# Patient Record
Sex: FEMALE | Race: BLACK OR AFRICAN AMERICAN | Employment: UNEMPLOYED | URBAN - METROPOLITAN AREA
[De-identification: names, ages, dates, MRNs, and addresses within clinical notes are randomized per-mention and may not be internally consistent; named-entity substitution may affect disease eponyms.]

---

## 2017-07-01 RX ORDER — NADOLOL 20 MG/1
TABLET ORAL
Qty: 180 TAB | Refills: 0 | OUTPATIENT
Start: 2017-07-01

## 2017-08-30 ENCOUNTER — OFFICE VISIT (OUTPATIENT)
Dept: FAMILY MEDICINE CLINIC | Age: 62
End: 2017-08-30

## 2017-08-30 VITALS
HEIGHT: 60 IN | DIASTOLIC BLOOD PRESSURE: 88 MMHG | BODY MASS INDEX: 43.15 KG/M2 | TEMPERATURE: 98.2 F | SYSTOLIC BLOOD PRESSURE: 154 MMHG | HEART RATE: 82 BPM | RESPIRATION RATE: 18 BRPM | WEIGHT: 219.8 LBS

## 2017-08-30 DIAGNOSIS — Z23 NEED FOR ZOSTER VACCINATION: ICD-10-CM

## 2017-08-30 DIAGNOSIS — E11.9 TYPE 2 DIABETES MELLITUS WITHOUT COMPLICATION, WITHOUT LONG-TERM CURRENT USE OF INSULIN (HCC): Primary | ICD-10-CM

## 2017-08-30 DIAGNOSIS — Z23 ENCOUNTER FOR IMMUNIZATION: ICD-10-CM

## 2017-08-30 DIAGNOSIS — Z23 NEED FOR TDAP VACCINATION: ICD-10-CM

## 2017-08-30 DIAGNOSIS — Z12.11 COLON CANCER SCREENING: ICD-10-CM

## 2017-08-30 DIAGNOSIS — Z78.0 MENOPAUSE: ICD-10-CM

## 2017-08-30 DIAGNOSIS — Z11.59 NEED FOR HEPATITIS C SCREENING TEST: ICD-10-CM

## 2017-08-30 DIAGNOSIS — I10 HTN (HYPERTENSION), BENIGN: ICD-10-CM

## 2017-08-30 RX ORDER — LOSARTAN POTASSIUM 50 MG/1
50 TABLET ORAL DAILY
Qty: 30 TAB | Refills: 1 | Status: SHIPPED | OUTPATIENT
Start: 2017-08-30 | End: 2018-03-16

## 2017-08-30 NOTE — MR AVS SNAPSHOT
Visit Information Date & Time Provider Department Dept. Phone Encounter #  
 8/30/2017 10:30 AM Roddy Blanchard 35 [id number] Upcoming Health Maintenance Date Due Hepatitis C Screening 1955 MICROALBUMIN Q1 6/6/1966 EYE EXAM RETINAL OR DILATED Q1 6/6/1966 Pneumococcal 19-64 Medium Risk (1 of 1 - PPSV23) 6/6/1975 DTaP/Tdap/Td series (1 - Tdap) 6/6/1977 FOBT Q 1 YEAR AGE 50-75 11/12/2013 PAP AKA CERVICAL CYTOLOGY 11/12/2015 FOOT EXAM Q1 11/21/2015 HEMOGLOBIN A1C Q6M 2/10/2016 ZOSTER VACCINE AGE 60> 4/6/2016 LIPID PANEL Q1 8/10/2016 BREAST CANCER SCRN MAMMOGRAM 2/9/2017 INFLUENZA AGE 9 TO ADULT 8/1/2017 Allergies as of 8/30/2017  Review Complete On: 8/30/2017 By: Deidra Negron MD  
  
 Severity Noted Reaction Type Reactions Percocet [Oxycodone-acetaminophen] Medium 06/03/2011   Not Verified Nausea Only Dick Olp [Propoxyphene N-acetaminophen]  06/03/2011    Other (comments) delusional  
 Hydrochlorothiazide  05/02/2013   Side Effect Other (comments) Leg cramps Lisinopril  05/02/2013   Side Effect Cough Current Immunizations  Reviewed on 12/12/2011 No immunizations on file. Not reviewed this visit You Were Diagnosed With   
  
 Codes Comments Type 2 diabetes mellitus without complication, without long-term current use of insulin (HCC)    -  Primary ICD-10-CM: E11.9 ICD-9-CM: 250.00 HTN (hypertension), benign     ICD-10-CM: I10 
ICD-9-CM: 401.1 Menopause     ICD-10-CM: Z78.0 ICD-9-CM: 627.2 Need for hepatitis C screening test     ICD-10-CM: Z11.59 
ICD-9-CM: V73.89 Colon cancer screening     ICD-10-CM: Z12.11 ICD-9-CM: V76.51 Encounter for immunization     ICD-10-CM: H41 ICD-9-CM: V03.89 Need for zoster vaccination     ICD-10-CM: G14 ICD-9-CM: V04.89  Encounter for screening mammogram for malignant neoplasm of breast     ICD-10-CM: Z12.31 
 ICD-9-CM: A75.57 Need for Tdap vaccination     ICD-10-CM: Y00 ICD-9-CM: V06.1 Vitals BP Pulse Temp Resp Height(growth percentile) Weight(growth percentile) 154/88 82 98.2 °F (36.8 °C) (Oral) 18 5' (1.524 m) 219 lb 12.8 oz (99.7 kg) LMP BMI OB Status Smoking Status 2011 42.93 kg/m2 Postmenopausal Never Smoker Vitals History BMI and BSA Data Body Mass Index Body Surface Area 42.93 kg/m 2 2.05 m 2 Preferred Pharmacy Pharmacy Name Phone [facility] [address] [place] [address] [phone] Your Updated Medication List  
  
   
This list is accurate as of: 17 11:42 AM.  Always use your most recent med list.  
  
  
  
  
 CO Q-10 100 mg capsule Generic drug:  co-enzyme Q-10 Take 100 mg by mouth daily. Diphth, Pertus(Acell), Tetanus 2.5-8-5 Lf-mcg-Lf/0.5mL Susp susp Commonly known as:  BOOSTRIX TDAP  
0.5 mL by IntraMUSCular route once for 1 dose. losartan 50 mg tablet Commonly known as:  COZAAR Take 1 Tab by mouth daily. Milk Thistle 175 mg Cap Take  by mouth.  
  
 multivitamin tablet Commonly known as:  ONE A DAY Take 4 Tabs by mouth daily. nadolol 20 mg tablet Commonly known as:  CORGARD TAKE 1 TABLET BY MOUTH TWICE A DAY  
  
 pneumococcal 23-valent 25 mcg/0.5 mL injection Commonly known as:  PNEUMOVAX 23  
0.5 mL by IntraMUSCular route once for 1 dose. varicella zoster vacine live 19,400 unit/0.65 mL Susr injection Commonly known as:  ZOSTAVAX  
1 Vial by SubCUTAneous route once for 1 dose. Prescriptions Sent to Pharmacy Refills  
 pneumococcal 23-valent (PNEUMOVAX 23) 25 mcg/0.5 mL injection 0 Si.5 mL by IntraMUSCular route once for 1 dose. Class: Normal  
 Pharmacy: [address] [facility], [address] [place] [address] Ph #: [phone] Route: IntraMUSCular Teresa Washington,, Tetanus (BOOSTRIX TDAP) 2.5-8-5 Lf-mcg-Lf/0.5mL susp susp 0 Si.5 mL by IntraMUSCular route once for 1 dose. Class: Normal  
 Pharmacy: [address] Ph #: [phone] Route: IntraMUSCular  
 varicella zoster vacine live (ZOSTAVAX) 19,400 unit/0.65 mL susr injection 0 Si Vial by SubCUTAneous route once for 1 dose. Class: Normal  
 Pharmacy: [address] Ph #: [phone] Route: SubCUTAneous  
 losartan (COZAAR) 50 mg tablet 1 Sig: Take 1 Tab by mouth daily. Class: Normal  
 Pharmacy: [address] Ph #: [phone] Route: Oral  
  
We Performed the Following HEMOGLOBIN A1C WITH EAG [67047 CPT(R)] HEPATIC FUNCTION PANEL [29101 CPT(R)] HEPATITIS C AB [77052 CPT(R)]  DIABETES FOOT EXAM [HM7 Custom] LIPID PANEL [20550 CPT(R)] METABOLIC PANEL, BASIC [02378 CPT(R)] MICROALBUMIN, UR, RAND W/ MICROALBUMIN/CREA RATIO [id number] CPT(R)] OCCULT BLOOD, IMMUNOASSAY (FIT) [id number] CPT(R)] REFERRAL TO OPHTHALMOLOGY [REF57 Custom] Comments:  
 Diabetic eye exam  
  
To-Do List   
 2017 Imaging:  [facility] MAMMO BI SCREENING INCL CAD Referral Information Referral ID Referred By Referred To  
  
 [id number] Layman Gambler [facility] [address] [place], [address] Visits Status Start Date End Date 1 New Request 17 If your referral has a status of pending review or denied, additional information will be sent to support the outcome of this decision. Introducing [facility] HEALTH [facility]! Dear Vega Rossi: Thank you for requesting a OjoOido-[employer] account. Our records indicate that you already have an active OjoOido-[employer]ics account. You can access your account anytime at [email or web address] Did you know that you can access your hospital and ER discharge instructions at any time in twtMob? You can also review all of your test results from your hospital stay or ER visit. Additional Information If you have questions, please visit the Frequently Asked Questions section of the twtMob website at [email or web address] Remember, twtMob is NOT to be used for urgent needs. For medical emergencies, dial 911. Now available from your iPhone and Android! Please provide this summary of care documentation to your next provider. Your primary care clinician is listed as Angelia Torrez. If you have any questions after today's visit, please call [phone].

## 2017-08-30 NOTE — TELEPHONE ENCOUNTER
Pt called requesting refill on Nadolol, stating Dr. Samara Bird forgot to fill this after her appointment today. Pt also notes she's not going to take the vaccines that were sent to the pharmacy for shingles, Tdap and pneumonia because she doesn't have insurance and she never takes the flu shot.  Lyly

## 2017-08-30 NOTE — PROGRESS NOTES
Chief Complaint   Patient presents with    Blood Pressure Check     1. Have you been to the ER, urgent care clinic since your last visit? Hospitalized since your last visit? No    2. Have you seen or consulted any other health care providers outside of the [address] [place] [address] since your last visit? Include any pap smears or colon screening.  No

## 2017-08-30 NOTE — PROGRESS NOTES
HISTORY OF PRESENT ILLNESS  Patrick Blood is a 65 y.o. female. Diabetes   The history is provided by the patient. This is a chronic problem. The current episode started more than 1 week ago. The problem occurs constantly. The problem has been gradually worsening. Pertinent negatives include no chest pain, no abdominal pain, no headaches and no shortness of breath. Nothing aggravates the symptoms. Nothing relieves the symptoms. She has tried nothing for the symptoms. Review of Systems   Constitutional: Negative for weight loss. Weight gain   Eyes: Negative for blurred vision. Respiratory: Negative for shortness of breath. Cardiovascular: Negative for chest pain and leg swelling. Gastrointestinal: Negative for abdominal pain. Genitourinary:        No polyuria   Neurological: Negative for dizziness, sensory change, speech change, focal weakness and headaches. Endo/Heme/Allergies: Negative for polydipsia. Visit Vitals    /88    Pulse 82    Temp 98.2 °F (36.8 °C) (Oral)    Resp 18    Ht 5' (1.524 m)    Wt 219 lb 12.8 oz (99.7 kg)    LMP 06/30/2011    BMI 42.93 kg/m2     Physical Exam   Constitutional: She is oriented to person, place, and time. She appears well-developed and well-nourished. No distress. Cardiovascular: Normal rate, regular rhythm, normal heart sounds and intact distal pulses. Exam reveals no gallop and no friction rub. No murmur heard. Pulmonary/Chest: Effort normal and breath sounds normal. No respiratory distress. She has no wheezes. She has no rales. Neurological: She is alert and oriented to person, place, and time. Normal monofilament exam   Skin: Skin is warm and dry. She is not diaphoretic. Feet and nails in good condition, some onychomycosis   Nursing note and vitals reviewed. ASSESSMENT and PLAN    ICD-10-CM ICD-9-CM    1.  Type 2 diabetes mellitus without complication, without long-term current use of insulin ([facility]) E11.9 250.00 REFERRAL TO OPHTHALMOLOGY       DIABETES FOOT EXAM      LIPID PANEL      METABOLIC PANEL, BASIC      HEPATIC FUNCTION PANEL      HEMOGLOBIN A1C WITH EAG   2. HTN (hypertension), benign I10 401.1 MICROALBUMIN, UR, RAND W/ MICROALBUMIN/CREA RATIO      METABOLIC PANEL, BASIC      losartan (COZAAR) 50 mg tablet   3. Menopause Z78.0 627.2 MAURICE MAMMO BI SCREENING INCL CAD   4. Need for hepatitis C screening test Z11.59 V73.89 HEPATITIS C AB   5. Colon cancer screening Z12.11 V76.51 OCCULT BLOOD, IMMUNOASSAY (FIT)   6. Encounter for immunization Z23 V03.89 pneumococcal 23-valent (PNEUMOVAX 23) 25 mcg/0.5 mL injection   7. Need for zoster vaccination Z23 V04.89 varicella zoster vacine live (ZOSTAVAX) 19,400 unit/0.65 mL susr injection   8. Encounter for screening mammogram for malignant neoplasm of breast Z12.31 V76.12    9. Need for Tdap vaccination Z23 V06.1 Diphth, Pertus,Acell,, Tetanus (BOOSTRIX TDAP) 2.5-8-5 Lf-mcg-Lf/0.5mL susp susp        Unknown diabetic control, on no medications  Blood pressure elevated  Add losartan  Labs per orders. Mammogram  Pneumovax, Zostavax and TDAP at pharmacy  Eye exam  Foot exam  was performed. .  Sensory and motor testing was assessed . Pedal pulse(s) was assessed. Follow-up Disposition:  Return in about 6 weeks (around 10/11/2017) for blood pressure, review labs. Reviewed plan of care. Patient has provided input and agrees with goals.

## 2017-09-03 RX ORDER — NADOLOL 20 MG/1
TABLET ORAL
Qty: 180 TAB | Refills: 0 | Status: SHIPPED | OUTPATIENT
Start: 2017-09-03 | End: 2017-09-22 | Stop reason: SDUPTHER

## 2017-09-23 RX ORDER — NADOLOL 20 MG/1
TABLET ORAL
Qty: 30 TAB | Refills: 1 | Status: SHIPPED | OUTPATIENT
Start: 2017-09-23 | End: 2018-01-17 | Stop reason: SDUPTHER

## 2018-01-17 NOTE — LETTER
1/22/2018 11:27 AM 
 
Ms. Nida Ellington [address] [facility] [phone] Dear Ms. Vamsi: 
 
We've missed you! Please call our office at [phone] and schedule a blood pressure follow up appointment for your continued care. I can not continue to refill your medication until you are seen for an appointment. Look forward to seeing you with in the next 30 days, look forward to seeing you soon.   
 
 
 
Sincerely, 
 
 
Dorothea Silveira MD

## 2018-01-20 RX ORDER — NADOLOL 20 MG/1
TABLET ORAL
Qty: 30 TAB | Refills: 1 | Status: SHIPPED | OUTPATIENT
Start: 2018-01-20 | End: 2018-02-28 | Stop reason: SDUPTHER

## 2018-01-20 NOTE — TELEPHONE ENCOUNTER
Please call patient and schedule them for a blood pressure and diabetic follow up. Let her know I cannot continue to refill her medications until she comes in.

## 2018-03-04 RX ORDER — NADOLOL 20 MG/1
TABLET ORAL
Qty: 30 TAB | Refills: 1 | Status: SHIPPED | OUTPATIENT
Start: 2018-03-04 | End: 2018-03-16 | Stop reason: SDUPTHER

## 2018-03-16 ENCOUNTER — OFFICE VISIT (OUTPATIENT)
Dept: FAMILY MEDICINE CLINIC | Age: 63
End: 2018-03-16

## 2018-03-16 VITALS
WEIGHT: 208 LBS | SYSTOLIC BLOOD PRESSURE: 158 MMHG | RESPIRATION RATE: 18 BRPM | DIASTOLIC BLOOD PRESSURE: 99 MMHG | HEART RATE: 77 BPM | BODY MASS INDEX: 40.84 KG/M2 | HEIGHT: 60 IN | TEMPERATURE: 98.6 F

## 2018-03-16 DIAGNOSIS — E11.9 TYPE 2 DIABETES MELLITUS WITHOUT COMPLICATION, WITHOUT LONG-TERM CURRENT USE OF INSULIN (HCC): Primary | ICD-10-CM

## 2018-03-16 DIAGNOSIS — I10 HTN (HYPERTENSION), BENIGN: ICD-10-CM

## 2018-03-16 RX ORDER — NADOLOL 20 MG/1
TABLET ORAL
Qty: 60 TAB | Refills: 2 | Status: SHIPPED | OUTPATIENT
Start: 2018-03-16 | End: 2018-07-11 | Stop reason: SDUPTHER

## 2018-03-16 NOTE — MR AVS SNAPSHOT
[address] [phone] Patient: Julianne Sosa MRN: [id number] :1955 Visit Information Date & Time Provider Department Dept. Phone Encounter #  
 3/16/2018 10:45 AM Sreedhar Simmons MD [facility] 34 [id number] Follow-up Instructions Return in about 2 months (around 2018) for blood pressure, diabetes. Upcoming Health Maintenance Date Due Hepatitis C Screening 1955 MICROALBUMIN Q1 1966 EYE EXAM RETINAL OR DILATED Q1 1966 Pneumococcal 19-64 Medium Risk (1 of 1 - PPSV23) 1975 DTaP/Tdap/Td series (1 - Tdap) 1977 FOBT Q 1 YEAR AGE 50-75 2013 PAP AKA CERVICAL CYTOLOGY 2015 HEMOGLOBIN A1C Q6M 2/10/2016 ZOSTER VACCINE AGE 60> 2016 LIPID PANEL Q1 8/10/2016 BREAST CANCER SCRN MAMMOGRAM 2017 Influenza Age 5 to Adult 2017 FOOT EXAM Q1 2018 Allergies as of 3/16/2018  Review Complete On: 3/16/2018 By: Sreedhar Simmons MD  
  
 Severity Noted Reaction Type Reactions Percocet [Oxycodone-acetaminophen] Medium 2011   Not Verified Nausea Only Rebecca Huang [Propoxyphene N-acetaminophen]  2011    Other (comments) delusional  
 Hydrochlorothiazide  2013   Side Effect Other (comments) Leg cramps Lisinopril  2013   Side Effect Cough Current Immunizations  Reviewed on 2011 No immunizations on file. Not reviewed this visit You Were Diagnosed With   
  
 Codes Comments Type 2 diabetes mellitus without complication, without long-term current use of insulin (HCC)    -  Primary ICD-10-CM: E11.9 ICD-9-CM: 250.00 HTN (hypertension), benign     ICD-10-CM: I10 
ICD-9-CM: 401.1 Vitals BP Pulse Temp Resp Height(growth percentile) Weight(growth percentile) (!) 158/99 77 98.6 °F (37 °C) (Oral) 18 5' (1.524 m) 208 lb (94.3 kg) LMP BMI OB Status Smoking Status 06/30/2011 40.62 kg/m2 Postmenopausal Never Smoker Vitals History BMI and BSA Data Body Mass Index Body Surface Area  
 40.62 kg/m 2 2 m 2 Preferred Pharmacy Pharmacy Name Phone [address] [place] [address] [phone] Your Updated Medication List  
  
   
This list is accurate as of 3/16/18 11:45 AM.  Always use your most recent med list.  
  
  
  
  
 CO Q-10 100 mg capsule Generic drug:  co-enzyme Q-10 Take 100 mg by mouth daily. Milk Thistle 175 mg Cap Take  by mouth.  
  
 multivitamin tablet Commonly known as:  ONE A DAY Take 4 Tabs by mouth daily. nadolol 20 mg tablet Commonly known as:  CORGARD TAKE ONE TABLET BY MOUTH TWICE DAILY Prescriptions Sent to Pharmacy Refills  
 nadolol (CORGARD) 20 mg tablet 2 Sig: TAKE ONE TABLET BY MOUTH TWICE DAILY Class: Normal  
 Pharmacy: [facility] [address] ETHEL ROWE [address] Ph #: [phone] We Performed the Following HEMOGLOBIN A1C WITH EAG [55600 CPT(R)] HEPATIC FUNCTION PANEL [44515 CPT(R)] METABOLIC PANEL, BASIC [82519 CPT(R)] MICROALBUMIN, UR, RAND W/ MICROALB/CREAT RATIO [id number] CPT(R)] NMR LIPOPROFILE [id number] CPT(R)] Follow-up Instructions Return in about 2 months (around 5/16/2018) for blood pressure, diabetes. Introducing [facility]! Dear Holden Counter: Thank you for requesting a FortaTrust account. Our records indicate that you already have an active FortaTrust account. You can access your account anytime at [email or web address]. Traak Systems/Green Energy Transportation Did you know that you can access your hospital and ER discharge instructions at any time in FortaTrust? You can also review all of your test results from your hospital stay or ER visit. Additional Information If you have questions, please visit the Frequently Asked Questions section of the Base CRMhart website at [email or web address] Remember, Augmenix is NOT to be used for urgent needs. For medical emergencies, dial 911. Now available from your iPhone and Android! Please provide this summary of care documentation to your next provider. Your primary care clinician is listed as Merlene Roger. If you have any questions after today's visit, please call [phone].

## 2018-03-16 NOTE — PROGRESS NOTES
HISTORY OF PRESENT ILLNESS  Nida Ellington is a 65 y.o. female. HPI Comments: Nida Ellington is here for blood pressure follow up and is overdue for diabetic follow. She does not check her blood sugars. Evidently, she checks her blood pressure at home and they have been normal.  At her last visit, I prescribed losartan, however she did not take because she had a reaction to it (near syncope, palpitations). She has not been in because she did not have insurance, however, she hopes to get some in 2 weeks. Blood Pressure Check   The history is provided by the patient. This is a chronic problem. The current episode started more than 1 week ago. The problem occurs constantly. The problem has not changed since onset. Pertinent negatives include no chest pain, no abdominal pain, no headaches and no shortness of breath. Nothing aggravates the symptoms. The symptoms are relieved by medications. Treatments tried: Corgard. The treatment provided moderate relief. Review of Systems   Constitutional: Positive for weight loss. No weight gain   Eyes: Negative for blurred vision. Respiratory: Negative for shortness of breath. Cardiovascular: Negative for chest pain and leg swelling. Gastrointestinal: Negative for abdominal pain. Neurological: Negative for dizziness, sensory change, speech change, focal weakness and headaches. Visit Vitals    BP (!) 158/99    Pulse 77    Temp 98.6 °F (37 °C) (Oral)    Resp 18    Ht 5' (1.524 m)    Wt 208 lb (94.3 kg)    LMP 06/30/2011    BMI 40.62 kg/m2     BP Readings from Last 3 Encounters:   03/16/18 (!) 158/99   08/30/17 154/88   04/01/16 (!) 167/92     Physical Exam   Constitutional: She is oriented to person, place, and time. She appears well-developed and well-nourished. No distress. Cardiovascular: Normal rate, regular rhythm and normal heart sounds. Exam reveals no gallop and no friction rub. No murmur heard.   Pulmonary/Chest: Effort normal and breath sounds normal. No respiratory distress. She has no wheezes. She has no rales. Musculoskeletal: She exhibits no edema. Neurological: She is alert and oriented to person, place, and time. Skin: Skin is warm and dry. She is not diaphoretic. Nursing note and vitals reviewed. ASSESSMENT and PLAN    ICD-10-CM ICD-9-CM    1. Type 2 diabetes mellitus without complication, without long-term current use of insulin (HCC) E11.9 250.00 MICROALBUMIN, UR, RAND W/ MICROALB/CREAT RATIO      HEMOGLOBIN A1C WITH EAG      NMR LIPOPROFILE      HEPATIC FUNCTION PANEL      METABOLIC PANEL, BASIC   2. HTN (hypertension), benign Y26 549.2 METABOLIC PANEL, BASIC      nadolol (CORGARD) 20 mg tablet        Unknown diabetic control  Blood pressure elevated, did not tolerate losartan  Labs per orders. Restart nadolol    Follow-up Disposition:  Return in about 2 months (around 5/16/2018) for blood pressure, diabetes. Reviewed plan of care. Patient has provided input and agrees with goals.

## 2018-03-16 NOTE — PROGRESS NOTES
Chief Complaint   Patient presents with    Blood Pressure Check     f/u     1. Have you been to the ER, urgent care clinic since your last visit? No  Hospitalized since your last visit? No     2. Have you seen or consulted any other health care providers outside of the [address] [place] [address] since your last visit? Include any pap smears or colon screening. No      Pt cannot afford hm due tests due to financial difficulties.

## 2018-07-11 ENCOUNTER — OFFICE VISIT (OUTPATIENT)
Dept: FAMILY MEDICINE CLINIC | Age: 63
End: 2018-07-11

## 2018-07-11 VITALS
SYSTOLIC BLOOD PRESSURE: 136 MMHG | HEART RATE: 82 BPM | BODY MASS INDEX: 41.62 KG/M2 | RESPIRATION RATE: 16 BRPM | OXYGEN SATURATION: 99 % | DIASTOLIC BLOOD PRESSURE: 84 MMHG | HEIGHT: 60 IN | TEMPERATURE: 98.4 F | WEIGHT: 212 LBS

## 2018-07-11 DIAGNOSIS — E11.9 TYPE 2 DIABETES MELLITUS WITHOUT COMPLICATION, WITHOUT LONG-TERM CURRENT USE OF INSULIN (HCC): ICD-10-CM

## 2018-07-11 DIAGNOSIS — Z12.39 SCREENING FOR MALIGNANT NEOPLASM OF BREAST: ICD-10-CM

## 2018-07-11 DIAGNOSIS — I10 HTN (HYPERTENSION), BENIGN: ICD-10-CM

## 2018-07-11 DIAGNOSIS — E11.8 TYPE 2 DIABETES MELLITUS WITH COMPLICATION, WITHOUT LONG-TERM CURRENT USE OF INSULIN (HCC): Primary | ICD-10-CM

## 2018-07-11 DIAGNOSIS — E66.01 MORBID OBESITY (HCC): ICD-10-CM

## 2018-07-11 DIAGNOSIS — E66.01 OBESITY, MORBID (HCC): ICD-10-CM

## 2018-07-11 RX ORDER — NADOLOL 20 MG/1
TABLET ORAL
Qty: 180 TAB | Refills: 3 | Status: SHIPPED | OUTPATIENT
Start: 2018-07-11 | End: 2018-10-22 | Stop reason: SDUPTHER

## 2018-07-11 NOTE — ASSESSMENT & PLAN NOTE
Encouraged patient on diet, exercise and healthy lifestyles  Key Obesity Meds     Patient is on no anti-obesity meds.         No results found for: LEPTN, INSUL, HBA1C, GLU, CHOL, CHOLPOCT, HDL, LDLC, LDL, LDLCEXT, LDLCPOC, TRIGL, TGLPOCT, TSH, NA, NAPOC, K, KPOCT, GPT, ALTPOC, ALT, SGOT, ASTPOC, VITD3, CRP, [id number], TSHEXT

## 2018-07-11 NOTE — PROGRESS NOTES
Chief Complaint   Patient presents with    Hypertension     1. Have you been to the ER, urgent care clinic since your last visit? Hospitalized since your last visit? No    2. Have you seen or consulted any other health care providers outside of the [address] [place] [address] since your last visit? Include any pap smears or colon screening.  No

## 2018-07-11 NOTE — MR AVS SNAPSHOT
[address] 
[phone] Patient: Evita Peñaloza MRN: [id number] :1955 Visit Information Date & Time Provider Department Dept. Phone Encounter #  
 2018  2:00 PM Roddy Galicia 34 [id number] Follow-up Instructions Return in about 4 weeks (around 2018). Upcoming Health Maintenance Date Due Hepatitis C Screening 1955 MICROALBUMIN Q1 1966 EYE EXAM RETINAL OR DILATED Q1 1966 Pneumococcal 19-64 Medium Risk (1 of 1 - PPSV23) 1975 DTaP/Tdap/Td series (1 - Tdap) 1977 FOBT Q 1 YEAR AGE 50-75 2013 PAP AKA CERVICAL CYTOLOGY 2015 HEMOGLOBIN A1C Q6M 2/10/2016 ZOSTER VACCINE AGE 60> 2016 LIPID PANEL Q1 8/10/2016 BREAST CANCER SCRN MAMMOGRAM 2017 Influenza Age 5 to Adult 2018 FOOT EXAM Q1 2018 Allergies as of 2018  Review Complete On: 2018 By: Amadou Coker MD  
  
 Severity Noted Reaction Type Reactions Percocet [Oxycodone-acetaminophen] Medium 2011   Not Verified Nausea Only Climmie Em [Propoxyphene N-acetaminophen]  2011    Other (comments) delusional  
 Hydrochlorothiazide  2013   Side Effect Other (comments) Leg cramps Lisinopril  2013   Side Effect Cough Current Immunizations  Reviewed on 2011 No immunizations on file. Not reviewed this visit You Were Diagnosed With   
  
 Codes Comments Type 2 diabetes mellitus with complication, without long-term current use of insulin (HCC)    -  Primary ICD-10-CM: E11.8 ICD-9-CM: 250.90 HTN (hypertension), benign     ICD-10-CM: I10 
ICD-9-CM: 401.1 Morbid obesity ([facility]     ICD-10-CM: E66.01 
ICD-9-CM: 278.01 Screening for malignant neoplasm of breast     ICD-10-CM: Z12.31 
ICD-9-CM: V76.10 Type 2 diabetes mellitus without complication, without long-term current use of insulin (HCC)     ICD-10-CM: E11.9 ICD-9-CM: 250.00 Obesity, morbid ([address]     ICD-10-CM: E66.01 
ICD-9-CM: 278.01 Vitals BP Pulse Temp Resp Height(growth percentile) Weight(growth percentile) (!) 159/93 (BP 1 Location: Left arm, BP Patient Position: Sitting) 82 98.4 °F (36.9 °C) (Oral) 16 5' (1.524 m) 212 lb (96.2 kg) LMP SpO2 BMI OB Status Smoking Status 06/30/2011 99% 41.4 kg/m2 Postmenopausal Never Smoker Vitals History BMI and BSA Data Body Mass Index Body Surface Area  
 41.4 kg/m 2 2.02 m 2 Preferred Pharmacy Pharmacy Name Phone [address] [phone] Your Updated Medication List  
  
   
This list is accurate as of 7/11/18  2:44 PM.  Always use your most recent med list.  
  
  
  
  
 CO Q-10 100 mg capsule Generic drug:  co-enzyme Q-10 Take 300 mg by mouth daily. FISH -160-1,000 mg Cap Generic drug:  omega 3-dha-epa-fish oil Take  by mouth. Milk Thistle 175 mg Cap Take  by mouth.  
  
 multivitamin tablet Commonly known as:  ONE A DAY Take 4 Tabs by mouth daily. nadolol 20 mg tablet Commonly known as:  CORGARD TAKE ONE TABLET BY MOUTH TWICE DAILY Prescriptions Sent to Pharmacy Refills  
 nadolol (CORGARD) 20 mg tablet 3 Sig: TAKE ONE TABLET BY MOUTH TWICE DAILY Class: Normal  
 Pharmacy: [address] Ph #: [phone] We Performed the Following CBC W/O DIFF [11091 CPT(R)] HEMOGLOBIN A1C WITH EAG [32545 CPT(R)] LIPID PANEL [53361 CPT(R)] METABOLIC PANEL, COMPREHENSIVE [89794 CPT(R)] MICROALBUMIN, UR, RAND W/ MICROALB/CREAT RATIO [id number] CPT(R)] TSH+FREE T4 [id number] CPT(R)] Follow-up Instructions Return in about 4 weeks (around 8/8/2018). To-Do List   
 Around 07/11/2018 Imaging:  [facility] 3D AMMY W MAMMO BI SCREENING INCL CAD Patient Instructions HTN (hypertension), benign The patient has bp that is well controlled at home and is tolerating meds without side effect, will refill medicaiton Type 2 diabetes mellitus without complication (HCC) Will recheck labs and evaluate, address possible treatment if indicated Obesity Encouraged patient on diet, exercise and healthy lifestyles Obesity, morbid ([address] Encouraged patient on diet, exercise and healthy lifestyles Key Obesity Meds Patient is on no anti-obesity meds. No results found for: LEPTN, INSUL, HBA1C, GLU, CHOL, CHOLPOCT, HDL, LDLC, LDL, LDLCEXT, LDLCPOC, TRIGL, TGLPOCT, TSH, NA, NAPOC, K, KPOCT, GPT, ALTPOC, ALT, SGOT, ASTPOC, VITD3, CRP, [id number], TSHEXT Introducing [facility]! Dear Branden Medina: Thank you for requesting a Dianping account. Our records indicate that you already have an active Dianping account. You can access your account anytime at [email or web address] Did you know that you can access your hospital and ER discharge instructions at any time in Dianping? You can also review all of your test results from your hospital stay or ER visit. Additional Information If you have questions, please visit the Frequently Asked Questions section of the Dianping website at [email or web address] Remember, Dianping is NOT to be used for urgent needs. For medical emergencies, dial 911. Now available from your iPhone and Android! Please provide this summary of care documentation to your next provider. Your primary care clinician is listed as Basil Phoenix. If you have any questions after today's visit, please call [phone].

## 2018-07-11 NOTE — PROGRESS NOTES
Family Medicine Follow-Up Progress Note  Patient: Yennifer Dinero  1955, 59 y.o., female  Encounter Date: 7/11/2018    ASSESSMENT & PLAN  HTN (hypertension), benign  The patient has bp that is well controlled at home and is tolerating meds without side effect, will refill medicaiton    Type 2 diabetes mellitus without complication ([facility] [address]  Will recheck labs and evaluate, address possible treatment if indicated    Obesity  Encouraged patient on diet, exercise and healthy lifestyles    Obesity, morbid ([address]  Encouraged patient on diet, exercise and healthy lifestyles  Key Obesity Meds     Patient is on no anti-obesity meds. No results found for: LEPTN, INSUL, HBA1C, GLU, CHOL, CHOLPOCT, HDL, LDLC, LDL, LDLCEXT, LDLCPOC, TRIGL, TGLPOCT, TSH, NA, NAPOC, K, KPOCT, GPT, ALTPOC, ALT, SGOT, ASTPOC, VITD3, CRP, [id number], TSHEXT          Orders Placed This Encounter    [facility] 3D AMMY W MAMMO BI SCREENING INCL CAD     Standing Status:   Future     Standing Expiration Date:   8/11/2019     Order Specific Question:   Reason for Exam     Answer:   screening    CBC W/O DIFF    HEMOGLOBIN A1C WITH EAG    LIPID PANEL    METABOLIC PANEL, COMPREHENSIVE    MICROALBUMIN, UR, RAND W/ MICROALB/CREAT RATIO    TSH+FREE T4    omega 3-dha-epa-fish oil (FISH OIL) 100-160-1,000 mg cap     Sig: Take  by mouth.  nadolol (CORGARD) 20 mg tablet     Sig: TAKE ONE TABLET BY MOUTH TWICE DAILY     Dispense:  180 Tab     Refill:  3         ICD-10-CM ICD-9-CM    1. Type 2 diabetes mellitus with complication, without long-term current use of insulin (HCC) E11.8 250.90 CBC W/O DIFF      HEMOGLOBIN A1C WITH EAG      LIPID PANEL      METABOLIC PANEL, COMPREHENSIVE      MICROALBUMIN, UR, RAND W/ MICROALB/CREAT RATIO      TSH+FREE T4   2. HTN (hypertension), benign I10 401.1 nadolol (CORGARD) 20 mg tablet      LIPID PANEL      TSH+FREE T4   3. Morbid obesity (HCC) E66.01 278.01 TSH+FREE T4   4.  Screening for malignant neoplasm of breast Z12.31 V76.10 [facility] 3D AMMY W MAMMO BI SCREENING INCL CAD   5. Type 2 diabetes mellitus without complication, without long-term current use of insulin (HCC) E11.9 250.00    6. Obesity, morbid ([facility] E66.01 278.01        CHIEF COMPLAINT  Chief Complaint   Patient presents with    Hypertension       CARLYN Duke is a 59 y.o. female presenting today for follow up of HTN. She does not believe she has DM2, She reports home readings for BP is 120-140/70-90 and she reports compliance with her medications without side effects. No headache or vision changes. Not checking blood sugars at home, she was told in ER once that her BG was normal and she is not on medications. She tries to follow a healthy diet and then she tries to keep her weight stable. No recent weight loss, she reports she is very active in her day to day life. Review of Systems   Constitutional: Negative for chills and fever. Eyes: Negative for visual disturbance. Respiratory: Negative for shortness of breath. Cardiovascular: Negative for chest pain and leg swelling. Gastrointestinal: Negative for constipation, diarrhea, nausea and vomiting. Genitourinary: Negative for difficulty urinating. Musculoskeletal: Negative for arthralgias and myalgias. Neurological: Negative for seizures, syncope and headaches. Psychiatric/Behavioral:        At Baseline, stable   All other systems reviewed and are negative. OBJECTIVE  Visit Vitals    /84 (BP 1 Location: Left arm, BP Patient Position: Sitting)    Pulse 82    Temp 98.4 °F (36.9 °C) (Oral)    Resp 16    Ht 5' (1.524 m)    Wt 212 lb (96.2 kg)    LMP 06/30/2011    SpO2 99%    BMI 41.4 kg/m2     136/84 on manual recheck by physician    Physical Exam   Constitutional: She is oriented to person, place, and time. She appears well-developed and well-nourished. No distress. NAD, Nontoxic, Appears Stated Age, morbidly obese   HENT:   Head: Normocephalic and atraumatic. Mouth/Throat: Oropharynx is clear and moist.   Eyes: Conjunctivae and EOM are normal. Right eye exhibits no discharge. Left eye exhibits no discharge. No scleral icterus. Neck: Neck supple. Cardiovascular: Normal rate, regular rhythm and normal heart sounds. No murmur heard. Pulmonary/Chest: Effort normal and breath sounds normal. No stridor. No respiratory distress. She has no wheezes. She has no rales. Abdominal: Soft. Bowel sounds are normal. She exhibits no distension. There is no tenderness. Musculoskeletal: She exhibits no edema or tenderness. Neurological: She is alert and oriented to person, place, and time. Grossly intact CN   Skin: Skin is warm and dry. No rash noted. She is not diaphoretic. Psychiatric: She has a normal mood and affect. Her behavior is normal.   Nursing note and vitals reviewed. No results found for any visits on 07/11/18. HISTORICAL  Reviewed and updated today, and as noted below:    Past Medical History:   Diagnosis Date    Diabetes ([facility] [address] 7/3/2014    Financial difficulties 11/23/2014    HTN (hypertension), benign 5/21/2012    Hypertension     Obesity 11/12/2012     Past Surgical History:   Procedure Laterality Date    CARDIAC SURG PROCEDURE UNLIST      cardiac cath    HX GYN      c section    HX OTHER SURGICAL      breast reduction     History reviewed. No pertinent family history.   History   Smoking Status    Never Smoker   Smokeless Tobacco    Never Used     Social History     Social History    Marital status: SINGLE     Spouse name: N/A    Number of children: N/A    Years of education: N/A     Social History Main Topics    Smoking status: Never Smoker    Smokeless tobacco: Never Used    Alcohol use No    Drug use: No    Sexual activity: Yes     Birth control/ protection: None     Other Topics Concern    None     Social History Narrative     Allergies   Allergen Reactions    Percocet [Oxycodone-Acetaminophen] Nausea Only    Darvocet A500 [Propoxyphene N-Acetaminophen] Other (comments)     delusional    Hydrochlorothiazide Other (comments)     Leg cramps    Lisinopril Cough       No visits with results within 3 Month(s) from this visit. Latest known visit with results is:    Office Visit on 08/10/2015   Component Date Value Ref Range Status    Hemoglobin A1c 08/10/2015 6.6* 4.8 - 5.6 % Final    Comment:          Increased risk for diabetes: 5.7 - 6.4           Diabetes: >6.4           Glycemic control for adults with diabetes: <7.0      Cholesterol, total 08/10/2015 144  100 - 199 mg/dL Final    Triglyceride 08/10/2015 66  0 - 149 mg/dL Final    HDL Cholesterol 08/10/2015 63  >39 mg/dL Final    Comment: According to ATP-III Guidelines, HDL-C >59 mg/dL is considered a  negative risk factor for CHD.  VLDL, calculated 08/10/2015 13  5 - 40 mg/dL Final    LDL, calculated 08/10/2015 68  0 - 99 mg/dL Final    Glucose 08/10/2015 96  65 - 99 mg/dL Final    BUN 08/10/2015 9  6 - 24 mg/dL Final    Creatinine 08/10/2015 0.79  0.57 - 1.00 mg/dL Final    GFR est non-AA 08/10/2015 82  >59 mL/min/1.73 Final    GFR est AA 08/10/2015 95  >59 mL/min/1.73 Final    BUN/Creatinine ratio 08/10/2015 11  9 - 23 Final    Sodium 08/10/2015 140  134 - 144 mmol/L Final    Potassium 08/10/2015 4.5  3.5 - 5.2 mmol/L Final    Chloride 08/10/2015 100  97 - 108 mmol/L Final    CO2 08/10/2015 26  18 - 29 mmol/L Final    Calcium 08/10/2015 9.6  8.7 - 10.2 mg/dL Final    Protein, total 08/10/2015 6.6  6.0 - 8.5 g/dL Final    Albumin 08/10/2015 4.5  3.5 - 5.5 g/dL Final    GLOBULIN, TOTAL 08/10/2015 2.1  1.5 - 4.5 g/dL Final    A-G Ratio 08/10/2015 2.1  1.1 - 2.5 Final    Bilirubin, total 08/10/2015 0.2  0.0 - 1.2 mg/dL Final    Alk.  phosphatase 08/10/2015 55  39 - 117 IU/L Final    AST (SGOT) 08/10/2015 17  0 - 40 IU/L Final    ALT (SGPT) 08/10/2015 16  0 - 32 IU/L Final    WBC 08/10/2015 4.6  3.4 - 10.8 x10E3/uL Final    RBC 08/10/2015 4.63  3.77 - 5.28 x10E6/uL Final    HGB 08/10/2015 11.7  11.1 - 15.9 g/dL Final    HCT 08/10/2015 37.0  34.0 - 46.6 % Final    MCV 08/10/2015 80  79 - 97 fL Final    MCH 08/10/2015 25.3* 26.6 - 33.0 pg Final    MCHC 08/10/2015 31.6  31.5 - 35.7 g/dL Final    RDW 08/10/2015 17.3* 12.3 - 15.4 % Final    PLATELET 63/73/7129 661  150 - 379 x10E3/uL Final    VITAMIN D, 25-HYDROXY 08/10/2015 26.4* 30.0 - 100.0 ng/mL Final    Comment: Vitamin D deficiency has been defined by the [facility] practice guideline as a  level of serum 25-OH vitamin D less than 20 ng/mL (1,2). The Endocrine Society went on to further define vitamin D  insufficiency as a level between 21 and 29 ng/mL (2). 1. IOM ([place] of Medicine). 2010. Dietary reference     intakes for calcium and D. [address] [facility]: The     Re5ult. 2. Betsey MF, Radha NC, Terry JAMES, et al.     Evaluation, treatment, and prevention of vitamin D     deficiency: an Endocrine Society clinical practice     guideline. JCEM. 2011 Jul; 96(7):1911-30.  INTERPRETATION 08/10/2015 Note   Final    Supplement report is available. Kathie Arrieta MD  [facility]  07/11/18 2:03 PM    Portions of this note may have been populated using smart dictation software and may have \"sounds-like\" errors present. Pt was counseled on risks, benefits and alternatives of treatment options. All questions were asked and answered and the patient was agreeable with the treatment plan as outlined.

## 2018-07-11 NOTE — PATIENT INSTRUCTIONS
HTN (hypertension), benign  The patient has bp that is well controlled at home and is tolerating meds without side effect, will refill medicaiton    Type 2 diabetes mellitus without complication ([facility]  Will recheck labs and evaluate, address possible treatment if indicated    Obesity  Encouraged patient on diet, exercise and healthy lifestyles    Obesity, morbid ([facility]  Encouraged patient on diet, exercise and healthy lifestyles  Key Obesity Meds     Patient is on no anti-obesity meds.         No results found for: LEPTN, INSUL, HBA1C, GLU, CHOL, CHOLPOCT, HDL, LDLC, LDL, LDLCEXT, LDLCPOC, TRIGL, TGLPOCT, TSH, NA, NAPOC, K, KPOCT, GPT, ALTPOC, ALT, SGOT, ASTPOC, VITD3, CRP, [id number], TSHEXT

## 2018-07-11 NOTE — ASSESSMENT & PLAN NOTE
The patient has bp that is well controlled at home and is tolerating meds without side effect, will refill medicaiton

## 2018-08-24 ENCOUNTER — HOSPITAL ENCOUNTER (EMERGENCY)
Age: 63
Discharge: HOME | End: 2018-08-24
Attending: EMERGENCY MEDICINE | Admitting: EMERGENCY MEDICINE
Payer: SELF-PAY

## 2018-08-24 VITALS
HEIGHT: 64 IN | BODY MASS INDEX: 37.22 KG/M2 | DIASTOLIC BLOOD PRESSURE: 83 MMHG | SYSTOLIC BLOOD PRESSURE: 152 MMHG | RESPIRATION RATE: 19 BRPM | HEART RATE: 88 BPM | TEMPERATURE: 98.2 F | WEIGHT: 218 LBS | OXYGEN SATURATION: 96 %

## 2018-08-24 DIAGNOSIS — I47.1 SVT (SUPRAVENTRICULAR TACHYCARDIA) (HCC): Primary | ICD-10-CM

## 2018-08-24 LAB
ANION GAP SERPL CALC-SCNC: 12 MMOL/L (ref 5–15)
BUN SERPL-MCNC: 13 MG/DL (ref 6–20)
BUN/CREAT SERPL: 11 (ref 12–20)
CALCIUM SERPL-MCNC: 8.6 MG/DL (ref 8.5–10.1)
CHLORIDE SERPL-SCNC: 101 MMOL/L (ref 97–108)
CO2 SERPL-SCNC: 24 MMOL/L (ref 21–32)
COMMENT, HOLDF: NORMAL
CREAT SERPL-MCNC: 1.18 MG/DL (ref 0.55–1.02)
GLUCOSE SERPL-MCNC: 266 MG/DL (ref 65–100)
MAGNESIUM SERPL-MCNC: 2.1 MG/DL (ref 1.6–2.4)
POTASSIUM SERPL-SCNC: 4 MMOL/L (ref 3.5–5.1)
SAMPLES BEING HELD,HOLD: NORMAL
SODIUM SERPL-SCNC: 137 MMOL/L (ref 136–145)
TROPONIN I SERPL-MCNC: 0.2 NG/ML
TROPONIN I SERPL-MCNC: <0.05 NG/ML
TSH SERPL DL<=0.05 MIU/L-ACNC: 4.02 UIU/ML (ref 0.36–3.74)

## 2018-08-24 PROCEDURE — 99285 EMERGENCY DEPT VISIT HI MDM: CPT

## 2018-08-24 PROCEDURE — 74011250637 HC RX REV CODE- 250/637: Performed by: FAMILY MEDICINE

## 2018-08-24 PROCEDURE — 84484 ASSAY OF TROPONIN QUANT: CPT | Performed by: FAMILY MEDICINE

## 2018-08-24 PROCEDURE — 84443 ASSAY THYROID STIM HORMONE: CPT | Performed by: EMERGENCY MEDICINE

## 2018-08-24 PROCEDURE — 93005 ELECTROCARDIOGRAM TRACING: CPT

## 2018-08-24 PROCEDURE — 80048 BASIC METABOLIC PNL TOTAL CA: CPT | Performed by: EMERGENCY MEDICINE

## 2018-08-24 PROCEDURE — 36415 COLL VENOUS BLD VENIPUNCTURE: CPT | Performed by: EMERGENCY MEDICINE

## 2018-08-24 PROCEDURE — 83735 ASSAY OF MAGNESIUM: CPT | Performed by: EMERGENCY MEDICINE

## 2018-08-24 RX ORDER — DILTIAZEM HYDROCHLORIDE 30 MG/1
30 TABLET, FILM COATED ORAL
Status: COMPLETED | OUTPATIENT
Start: 2018-08-24 | End: 2018-08-24

## 2018-08-24 RX ORDER — DILTIAZEM HYDROCHLORIDE 30 MG/1
30 TABLET, FILM COATED ORAL 3 TIMES DAILY
Qty: 90 TAB | Refills: 0 | Status: SHIPPED | OUTPATIENT
Start: 2018-08-24 | End: 2019-08-16

## 2018-08-24 RX ADMIN — DILTIAZEM HYDROCHLORIDE 30 MG: 30 TABLET, FILM COATED ORAL at 19:36

## 2018-08-24 NOTE — ED PROVIDER NOTES
HPI Comments: 59year old female with PMH diabetes, HTN, obesity brought in by EMS with SVT. Patient states she was cleaning at about 4:55 P. M when she started to feel \"swirly,\" sweaty, and felt her heart racing. She called her daughter and then laid down. She felt better after she laid down. Her daughter came and tried to get her in the car but the pt felt too badly so they called EMS. Per EMS, upon their arrival, the pt's HR was in the 220s and she was in SVT. They gave her 6mg of adenosine. She remained in SVT. They gave her 12mg of adenosine. She converted to NSR. She vomited once. The patient denies headache, dizziness, vision changes, chest pain, shortness of breath, abdominal pain, nausea, edema. Patient is a 59 y.o. female presenting with palpitations. Irregular Heart Beat    Associated symptoms include vomiting. Pertinent negatives include no fever, no chest pain, no abdominal pain, no nausea, no headaches, no dizziness, no cough and no shortness of breath. EKGs from EMS:              Past Medical History:   Diagnosis Date    Diabetes ([facility] 7/3/2014    Financial difficulties 11/23/2014    HTN (hypertension), benign 5/21/2012    Hypertension     Obesity 11/12/2012       Past Surgical History:   Procedure Laterality Date    CARDIAC SURG PROCEDURE UNLIST      cardiac cath    HX GYN      c section    HX OTHER SURGICAL      breast reduction         No family history on file. Social History     Social History    Marital status: SINGLE     Spouse name: N/A    Number of children: N/A    Years of education: N/A     Occupational History    Not on file.      Social History Main Topics    Smoking status: Never Smoker    Smokeless tobacco: Never Used    Alcohol use No    Drug use: No    Sexual activity: Yes     Birth control/ protection: None     Other Topics Concern    Not on file     Social History Narrative         ALLERGIES: Percocet [oxycodone-acetaminophen]; Darvocet a500 [propoxyphene n-acetaminophen]; Hydrochlorothiazide; and Lisinopril    Review of Systems   Constitutional: Negative for chills and fever. Eyes: Negative for visual disturbance. Respiratory: Negative for cough and shortness of breath. Cardiovascular: Positive for palpitations. Negative for chest pain and leg swelling. Gastrointestinal: Positive for vomiting. Negative for abdominal pain, constipation, diarrhea and nausea. Neurological: Negative for dizziness and headaches. All other systems reviewed and are negative. Vitals:    08/24/18 1809   BP: (!) 174/103   Pulse: (!) 110   Resp: 24   Temp: 98.2 °F (36.8 °C)   SpO2: 96%   Weight: 98.9 kg (218 lb)   Height: 5' 4\" (1.626 m)            Physical Exam   Constitutional: She is oriented to person, place, and time. She appears well-developed and well-nourished. No distress. HENT:   Head: Normocephalic and atraumatic. Eyes: Conjunctivae are normal.   Neck: No JVD present. Cardiovascular: Regular rhythm. Tachycardia present. Exam reveals no gallop and no friction rub. No murmur heard. Pulmonary/Chest: Effort normal and breath sounds normal. No respiratory distress. She has no wheezes. Abdominal: Soft. Bowel sounds are normal. There is no tenderness. Musculoskeletal: She exhibits no edema. Neurological: She is alert and oriented to person, place, and time. Skin: Skin is warm. She is diaphoretic. Psychiatric: She has a normal mood and affect. EKG: sinus tachycardia, rate 107, normal axis, prolonged QT, PVCs, LVH, nonspecific ST and T wave abnormalities     Recent Results (from the past 12 hour(s))   SAMPLES BEING HELD    Collection Time: 08/24/18  6:15 PM   Result Value Ref Range    SAMPLES BEING HELD LV. ARTURO     COMMENT        Add-on orders for these samples will be processed based on acceptable specimen integrity and analyte stability, which may vary by analyte.    METABOLIC PANEL, BASIC    Collection Time: 08/24/18  6:15 PM Result Value Ref Range    Sodium 137 136 - 145 mmol/L    Potassium 4.0 3.5 - 5.1 mmol/L    Chloride 101 97 - 108 mmol/L    CO2 24 21 - 32 mmol/L    Anion gap 12 5 - 15 mmol/L    Glucose 266 (H) 65 - 100 mg/dL    BUN 13 6 - 20 MG/DL    Creatinine 1.18 (H) 0.55 - 1.02 MG/DL    BUN/Creatinine ratio 11 (L) 12 - 20      GFR est AA 56 (L) >60 ml/min/1.73m2    GFR est non-AA 46 (L) >60 ml/min/1.73m2    Calcium 8.6 8.5 - 10.1 MG/DL   TSH 3RD GENERATION    Collection Time: 08/24/18  6:15 PM   Result Value Ref Range    TSH 4.02 (H) 0.36 - 3.74 uIU/mL   MAGNESIUM    Collection Time: 08/24/18  6:15 PM   Result Value Ref Range    Magnesium 2.1 1.6 - 2.4 mg/dL   TROPONIN I    Collection Time: 08/24/18  6:15 PM   Result Value Ref Range    Troponin-I, Qt. <0.05 <0.05 ng/mL         7:29 PM  Spoke with Dr. Chester Gomez of cardiology. He recommended starting cardizem 30mg PO tid and following up with him next week. I reviewed this plan with the patient. All questions were answered. Discussed with attending Dr. Manas Horton who agrees. 8:00 PM  Patient signed out to Dr. Manas Horton.      [facility]      ED Course       Procedures

## 2018-08-24 NOTE — DISCHARGE INSTRUCTIONS
Supraventricular Tachycardia: Care Instructions  Your Care Instructions    Having supraventricular tachycardia (SVT) means that from time to time your heart beats abnormally fast. This fast rhythm is caused by changes in the electrical system of your heart. You may feel a fluttering in your chest (palpitations) and have a fast pulse. When your heart is beating fast, you may feel anxious and lightheaded, be short of breath, and feel discomfort in the chest.  Your doctor may prescribe medicines to help slow down your heartbeat. Your doctor may also suggest you try vagal maneuvers when having an episode of SVT. These are things, like bearing down, that might help slow your heart rate. Bearing down means that you try to breathe out with your stomach muscles but you don't let air out of your nose or mouth. Your doctor can show you how to do vagal maneuvers. He or she may suggest you lie down on your back to do them. In some cases, either cardioversion treatment or a procedure called catheter ablation is done to correct SVT. Your doctor may ask you to wear a small electronic device for 1 or 2 days to monitor your heart. It is called a Holter monitor. Follow-up care is a key part of your treatment and safety. Be sure to make and go to all appointments, and call your doctor if you are having problems. It's also a good idea to know your test results and keep a list of the medicines you take. How can you care for yourself at home? · Be safe with medicines. Take your medicines exactly as prescribed. Call your doctor if you think you are having a problem with your medicine. You will get more details on the specific medicines your doctor prescribes. · If your doctor showed you how to do vagal maneuvers, try them when you have an episode. These maneuvers include bearing down or putting an ice-cold, wet towel on your face. · Monitor your condition by keeping a diary of your SVT episodes.  Bring this to your doctor appointments. ¨ Write down how fast or slow your heart was beating. To count your heart rate:  § Gently place 2 fingers of your hand on the inside of your other wrist, below your thumb. § Count the beats for 30 seconds. § Then, double the result to get the number of beats per minute. ¨ Write down if your heart rhythm was regular or irregular. ¨ Write down the symptoms you had. ¨ Write down the time of day your symptoms occurred. ¨ Write down how long your symptoms lasted. ¨ Write down what you were doing when your symptoms started. ¨ Write down what may have helped your symptoms go away. · If they trigger episodes, limit or avoid alcohol or drinks with caffeine. · Do not use over-the-counter decongestants, herbal remedies, diet pills, or \"pep\" pills, which often contain stimulants. · Do not use illegal drugs, such as cocaine, ecstasy, or methamphetamine, which can speed up your heart's rhythm. · Do not smoke. Smoking can make this condition worse. If you need help quitting, talk to your doctor about stop-smoking programs and medicines. These can increase your chances of quitting for good. · Be alert for new or worsening symptoms, such as shortness of breath, pounding of your heart, or unusual tiredness. If new symptoms develop or your symptoms become worse, call your doctor. When should you call for help? Call 911 anytime you think you may need emergency care. For example, call if:    · You passed out (lost consciousness).     · You are short of breath.    Call your doctor now or seek immediate medical care if:    · You have a fast heartbeat.     · You are dizzy or lightheaded, or feel like you may faint.    Watch closely for changes in your health, and be sure to contact your doctor if:    · You do not get better as expected. Where can you learn more? Go to http://ashok-ian.info/.   Enter G244 in the search box to learn more about \"Supraventricular Tachycardia: Care Instructions. \"  Current as of: December 6, 2017  Content Version: 11.7  © 2975-9751 [employer], [facility]. Care instructions adapted under license by "[employer]" (which disclaims liability or warranty for this information). If you have questions about a medical condition or this instruction, always ask your healthcare professional. [place] [address] any warranty or liability for your use of this information.

## 2018-08-24 NOTE — ED TRIAGE NOTES
Pt arrived via EMS due to SVT that began 20 minutes prior to EMS arrival. Per EMS, pt was diaphoretic, nauseated, and vomiting upon arrival. EMS administered adenosine 6 mg/12 mg (2 doses/18 mg total). Pt converted after the second dose. Pt is is sinus tach on EMS monitor. Pt A&Ox4.

## 2018-08-25 NOTE — ED NOTES
PROVIDED WITH DC INSTRUCTIONS BY PROVIDER. VERBALIZED UNDERSTANDING.  AMBULATED OUT OF ED WITH FAMILY WITH STEADY UPRIGHT GAIT

## 2018-08-25 NOTE — ED NOTES
PT RESTING IN BED WITH DAUGHTER AT BEDSIDE. AOX4. SKIN WARM, DRY, PINK. RR EVEN AND UNLABORED. NAD. DENIES ANY COMPLAINTS AT THIS TIME. UPDATED ON POC.  WILL CONT TO MONITOR

## 2018-08-28 LAB
ATRIAL RATE: 107 BPM
CALCULATED P AXIS, ECG09: 46 DEGREES
CALCULATED R AXIS, ECG10: -3 DEGREES
CALCULATED T AXIS, ECG11: -30 DEGREES
DIAGNOSIS, 93000: NORMAL
P-R INTERVAL, ECG05: 128 MS
Q-T INTERVAL, ECG07: 374 MS
QRS DURATION, ECG06: 92 MS
QTC CALCULATION (BEZET), ECG08: 499 MS
VENTRICULAR RATE, ECG03: 107 BPM

## 2018-08-29 ENCOUNTER — OFFICE VISIT (OUTPATIENT)
Dept: CARDIOLOGY CLINIC | Age: 63
End: 2018-08-29

## 2018-08-29 VITALS
RESPIRATION RATE: 18 BRPM | SYSTOLIC BLOOD PRESSURE: 132 MMHG | OXYGEN SATURATION: 98 % | BODY MASS INDEX: 35.44 KG/M2 | WEIGHT: 207.6 LBS | DIASTOLIC BLOOD PRESSURE: 82 MMHG | HEART RATE: 83 BPM | HEIGHT: 64 IN

## 2018-08-29 DIAGNOSIS — E11.9 TYPE 2 DIABETES MELLITUS WITHOUT COMPLICATION, WITHOUT LONG-TERM CURRENT USE OF INSULIN (HCC): ICD-10-CM

## 2018-08-29 DIAGNOSIS — I10 HTN (HYPERTENSION), BENIGN: Primary | ICD-10-CM

## 2018-08-29 NOTE — PROGRESS NOTES
Chief Complaint   Patient presents with    Irregular Heart Beat     seen in [facility] ER 8/24/18     1. Have you been to the ER, urgent care clinic since your last visit? Hospitalized since your last visit? Yes, 8/24/18,[facility] ER,irregular hear beat    2. Have you seen or consulted any other health care providers outside of the [address] [place] [address] since your last visit? Include any pap smears or colon screening.  No    Visit Vitals    /82 (BP 1 Location: Left arm, BP Patient Position: Sitting)    Pulse 83    Resp 18    Ht 5' 4\" (1.626 m)    Wt 207 lb 9.6 oz (94.2 kg)    LMP 06/30/2011    SpO2 98%    BMI 35.63 kg/m2

## 2018-08-29 NOTE — PROGRESS NOTES
CARDIOLOGY OFFICE NOTE                                        Jose Carreon MD, [address], [address], [place], [address] [facility]                                                       Phone [phone]; Fax [phone]                                                      Mobile [phone]   Voice Mail [phone]    No admission date for patient encounter. Suresh Pineda MD    :  1955   MRN:  [id number]     LAST OFFICE VISIT : -           Perry Oneal is a 59 y.o. female I am seeing for ED follow up. On 18 patient experienced a \"swirling and sweaty\" sensation and felt her heart race. She was brought to the ED and was found to have SVT. Her Troponin was slightly elevated, but this was felt to be due to SVT. She converted to normal sinus rhythm after receiving 12 mg of Adenosine. In the past she had an echo that showed EF 50%. She also had anormal stress echo in the past.     Today, she states she has never experienced this in the past. She attributes it to caffeine. She does not smoke cigarettes. She has no family history of cardiac disease. She did not start Cardizem 30 mg TID. She has a history of HTN and DM. She states she consumes a healthy diet, and cooks for herself. She does not exercise regularly. She does not drink alcohol. She states she does not have LOREN. Patient denies any exertional chest pain, dyspnea, syncope, orthopnea, edema or paroxysmal nocturnal dyspnea.       Allergies   Allergen Reactions    Percocet [Oxycodone-Acetaminophen] Nausea Only    Darvocet A500 [Propoxyphene N-Acetaminophen] Other (comments)     delusional    Hydrochlorothiazide Other (comments)     Leg cramps    Lisinopril Cough         Past Medical History:   Diagnosis Date    Financial difficulties 11/23/2014    HTN (hypertension), benign 5/21/2012    Hypertension     Obesity 11/12/2012        Past Surgical History:   Procedure Laterality Date    CARDIAC SURG PROCEDURE UNLIST      cardiac cath    HX GYN      c section    HX OTHER SURGICAL      breast reduction       Home Medications:  Current Outpatient Prescriptions   Medication Sig    dilTIAZem (CARDIZEM) 30 mg tablet Take 1 Tab by mouth three (3) times daily.  omega 3-dha-epa-fish oil (FISH OIL) 100-160-1,000 mg cap Take  by mouth.  nadolol (CORGARD) 20 mg tablet TAKE ONE TABLET BY MOUTH TWICE DAILY    Milk Thistle 175 mg Cap Take  by mouth.  multivitamin (ONE A DAY) tablet Take 4 Tabs by mouth daily.  co-enzyme Q-10 (CO Q-10) 100 mg capsule Take 300 mg by mouth daily. No current facility-administered medications for this visit. OBJECTIVE      Lab Results   Component Value Date/Time    INR 1.0 04/28/2012 12:00 AM    Prothrombin time 10.3 04/28/2012 12:00 AM                   CARDIOMETRICS      1. Stress echo  5/11/2012: baseline sinus tach, no ischemia or LV dysfunction    2. Cardiac Cath  5/15/12- EF 50%, No CAD    3. Lipids  8/10/15- , LDL 68, HDL 63, TG 66                                                            Social History:  Social History   Substance Use Topics    Smoking status: Never Smoker    Smokeless tobacco: Never Used    Alcohol use No       Family History:  No family history on file. Review of Symptoms:  A comprehensive review of systems ,particularly related to cardiovascular system, was negative except for that written in the HPI.     Physical Exam:    Visit Vitals    LMP 06/30/2011       Physical Exam:  Gen: Well-developed, well-nourished, in no acute distress  alert and oriented x 3  HEENT:  Pink conjunctivae, Hearing grossly normal.No scleral icterus or conjunctival, moist mucous membranes  Neck: Supple,No JVD, No Carotid Bruit, Thyroid- non tender No cervical lymphadenopathy  Resp: No accessory muscle use, Clear breath sounds, No rales or rhonchi  Card: Regular Rate,Rythm,Normal S1, S2, No murmurs, rubs or gallop. No thrills. Abd:  Soft, non-tender, non-distended, normoactive bowel sounds are present,   MSK: No cyanosis or clubbing, good capillary refill  Skin: No rashes or ulcers, no bruising  Neuro:  Cranial nerves are grossly intact, moving all four extremities, no focal deficit, follows commands appropriately  Psych:  Good insight, oriented to person, place and time, alert, Nml Affect  LE: No edema  Vascular: Distal Pulses 2+ and symmetric        Medication change on this office visit: I have reviewed patients medication list and have made no changes today. ASSESSMENT/RECOMMENDATION:  1) SVT  - She has not had any further episodes. She is not taking Cardizen regularly. I told her to take Cardizem PRN and tell me if she needs to use it. - I will order an echo to look at [facility] and left atrial size and a lexiscan cardiolite to assess heart function because her Troponin was elevated. - I went over vagal maneuver today and discussed those options with her    2) Healthcare Maintenance  - I advised patient to floss regularly. - I recommended she exercise regularly and continue a healthy diet for weight loss    Follow up in 6 months        Patient Care Team:  Parrish Davila MD as PCP - General (Family Practice)  Flori Cheung RN as Nurse Navigator (Cardiology)  Grazyna Mayes LPN as Ambulatory Care Navigator [facility])    I have extensively reviewed all testing with the patient. I have discussed the diagnosis with the patient and the intended plan as seen in the above orders. Questions were answered concerning future plans. I have discussed medication side effects and warnings with the patient as well. Kiran Martinez is in agreement to the plan listed above and wishes to proceed.      she  was instructed not to smoke, eat heart healthy diet  and to exercise. Thank you for this consult.     Sue Dia    Written by Aashish Durham, as dictated by Dr. Delfino Bass.

## 2018-08-29 NOTE — MR AVS SNAPSHOT
[address] [facility] [address] [facility] 
[phone] Patient: Solange Foster MRN: [id number] :1955 Visit Information Date & Time Provider Department Dept. Phone Encounter #  
 2018  3:40 PM Cara Gore MD [facility] [phone] [id number] Follow-up Instructions Return in about 6 months (around 2019). Your Appointments 2018  3:00 PM  
ECHO CARDIOGRAMS 2D with MANDY RAY  
[facility] ([facility]) Appt Note: echo per dr Missy Gaytan [address] [facility] [address] [facility]  
[phone]  
  
   
 [address] [address]  
  
    
 3/27/2019  3:20 PM  
ESTABLISHED PATIENT with Cara Gore MD  
[facility] ([facility]) Appt Note: 6 mo fup  
 [address] [facility] [address] [facility]  
[address] [facility] [address] [address] Upcoming Health Maintenance Date Due Hepatitis C Screening 1955 MICROALBUMIN Q1 1966 EYE EXAM RETINAL OR DILATED Q1 1966 Pneumococcal 19-64 Medium Risk (1 of 1 - PPSV23) 1975 DTaP/Tdap/Td series (1 - Tdap) 1977 FOBT Q 1 YEAR AGE 50-75 2013 PAP AKA CERVICAL CYTOLOGY 2015 HEMOGLOBIN A1C Q6M 2/10/2016 ZOSTER VACCINE AGE 60> 2016 LIPID PANEL Q1 8/10/2016 BREAST CANCER SCRN MAMMOGRAM 2017 Influenza Age 5 to Adult 2018 FOOT EXAM Q1 2018 Allergies as of 2018  Review Complete On: 2018 By: Craa Gore MD  
  
 Severity Noted Reaction Type Reactions Percocet [Oxycodone-acetaminophen] Medium 2011   Not Verified Nausea Only Michelle Abed [Propoxyphene N-acetaminophen]  2011    Other (comments)  delusional  
 Hydrochlorothiazide  05/02/2013   Side Effect Other (comments) Leg cramps Lisinopril  05/02/2013   Side Effect Cough Current Immunizations  Reviewed on 12/12/2011 No immunizations on file. Not reviewed this visit You Were Diagnosed With   
  
 Codes Comments HTN (hypertension), benign    -  Primary ICD-10-CM: I10 
ICD-9-CM: 401.1 Type 2 diabetes mellitus without complication, without long-term current use of insulin (HCC)     ICD-10-CM: E11.9 ICD-9-CM: 250.00 Vitals BP Pulse Resp Height(growth percentile) Weight(growth percentile) LMP  
 132/82 (BP 1 Location: Left arm, BP Patient Position: Sitting) 83 18 5' 4\" (1.626 m) 207 lb 9.6 oz (94.2 kg) 06/30/2011 SpO2 BMI OB Status Smoking Status 98% 35.63 kg/m2 Postmenopausal Never Smoker Vitals History BMI and BSA Data Body Mass Index Body Surface Area  
 35.63 kg/m 2 2.06 m 2 Preferred Pharmacy Pharmacy Name Phone [address] [phone] Your Updated Medication List  
  
   
This list is accurate as of 8/29/18  4:26 PM.  Always use your most recent med list.  
  
  
  
  
 CO Q-10 100 mg capsule Generic drug:  co-enzyme Q-10 Take 300 mg by mouth daily. dilTIAZem 30 mg tablet Commonly known as:  CARDIZEM Take 1 Tab by mouth three (3) times daily. FISH -160-1,000 mg Cap Generic drug:  omega 3-dha-epa-fish oil Take 1 Tab by mouth daily. Milk Thistle 175 mg Cap Take 175 mg by mouth daily. multivitamin tablet Commonly known as:  ONE A DAY Take 1 Tab by mouth daily. nadolol 20 mg tablet Commonly known as:  CORGARD TAKE ONE TABLET BY MOUTH TWICE DAILY Follow-up Instructions Return in about 6 months (around 2/28/2019). To-Do List   
 08/29/2018 ECHO:  2D ECHO COMPLETE ADULT (TTE) W OR WO CONTR   
  
 08/29/2018 ECG:  STRESS TEST LEXISCAN/CARDIOLITE Introducing [facility]! Dear Raffi Vera: Thank you for requesting a [employer] account. Our records indicate that you already have an active [employer] account. You can access your account anytime at [email or web address] Did you know that you can access your hospital and ER discharge instructions at any time in [employer]? You can also review all of your test results from your hospital stay or ER visit. Additional Information If you have questions, please visit the Frequently Asked Questions section of the [employer] website at [email or web address] Remember, [facility] is NOT to be used for urgent needs. For medical emergencies, dial 911. Now available from your iPhone and Android! Please provide this summary of care documentation to your next provider. Your primary care clinician is listed as Oniel Farah. If you have any questions after today's visit, please call [phone].

## 2018-09-05 ENCOUNTER — CLINICAL SUPPORT (OUTPATIENT)
Dept: CARDIOLOGY CLINIC | Age: 63
End: 2018-09-05

## 2018-09-05 DIAGNOSIS — I47.1 SVT (SUPRAVENTRICULAR TACHYCARDIA) (HCC): Primary | ICD-10-CM

## 2018-09-17 ENCOUNTER — DOCUMENTATION ONLY (OUTPATIENT)
Dept: CARDIOLOGY CLINIC | Age: 63
End: 2018-09-17

## 2018-09-17 NOTE — PROGRESS NOTES
Echocardiogram report      Please call patient and let them know EF is low normal and this is ok. Otherwise a mildly leaky valve of no concern. Overall looks good.     Jai Mitchell MD

## 2018-10-22 DIAGNOSIS — I10 HTN (HYPERTENSION), BENIGN: ICD-10-CM

## 2018-10-22 RX ORDER — NADOLOL 20 MG/1
TABLET ORAL
Qty: 180 TAB | Refills: 0 | Status: SHIPPED | OUTPATIENT
Start: 2018-10-22 | End: 2019-03-01 | Stop reason: SDUPTHER

## 2018-10-22 NOTE — TELEPHONE ENCOUNTER
Pt called requesting refill be sent to [facility] DR ETHEL ROWE for Nadolol.   Pt rescheduled for follow up with Dr. Janell Silverio on 10/29/18 at 2:15 pm. Wilderm

## 2019-03-01 DIAGNOSIS — I10 HTN (HYPERTENSION), BENIGN: ICD-10-CM

## 2019-03-04 RX ORDER — NADOLOL 20 MG/1
TABLET ORAL
Qty: 180 TAB | Refills: 0 | Status: SHIPPED | OUTPATIENT
Start: 2019-03-04 | End: 2019-08-09 | Stop reason: SDUPTHER

## 2019-08-09 DIAGNOSIS — I10 HTN (HYPERTENSION), BENIGN: ICD-10-CM

## 2019-08-12 RX ORDER — NADOLOL 20 MG/1
TABLET ORAL
Qty: 180 TAB | Refills: 0 | Status: SHIPPED | OUTPATIENT
Start: 2019-08-12 | End: 2019-10-11

## 2019-08-16 ENCOUNTER — OFFICE VISIT (OUTPATIENT)
Dept: FAMILY MEDICINE CLINIC | Age: 64
End: 2019-08-16

## 2019-08-16 VITALS
DIASTOLIC BLOOD PRESSURE: 97 MMHG | RESPIRATION RATE: 18 BRPM | HEIGHT: 64 IN | BODY MASS INDEX: 37.36 KG/M2 | TEMPERATURE: 97.6 F | HEART RATE: 72 BPM | OXYGEN SATURATION: 100 % | SYSTOLIC BLOOD PRESSURE: 180 MMHG | WEIGHT: 218.8 LBS

## 2019-08-16 DIAGNOSIS — E66.01 CLASS 2 SEVERE OBESITY DUE TO EXCESS CALORIES WITH SERIOUS COMORBIDITY AND BODY MASS INDEX (BMI) OF 37.0 TO 37.9 IN ADULT (HCC): ICD-10-CM

## 2019-08-16 DIAGNOSIS — R79.89 ELEVATED TSH: ICD-10-CM

## 2019-08-16 DIAGNOSIS — I10 HTN (HYPERTENSION), BENIGN: Primary | ICD-10-CM

## 2019-08-16 DIAGNOSIS — E11.65 UNCONTROLLED TYPE 2 DIABETES MELLITUS WITH HYPERGLYCEMIA (HCC): ICD-10-CM

## 2019-08-16 NOTE — PROGRESS NOTES
Chief Complaint   Patient presents with    Hypertension     Follow up     1. Have you been to the ER, urgent care clinic since your last visit? Hospitalized since your last visit? No    2. Have you seen or consulted any other health care providers outside of the [address] [place] [address] since your last visit? Include any pap smears or colon screening. No    Provider notified patient's B/P 180/97.

## 2019-08-16 NOTE — PATIENT INSTRUCTIONS
Uncontrolled hypertension  Due for labs as ordered  Continue with nadolol, given her diabetic history we may need to consider using an ACE or an ARB  I have encouraged her to follow a healthy diet, and exercise moderate weight loss is recommended to decrease her cardiovascular risk factors and improve her cardiovascular fitness level  She had an abnormal TSH that was elevated in the past, I had an extensive discussion with her about the pathophysiology of thyroid disease and we will recheck her thyroid  It appears that she has uncontrolled type 2 diabetes that is not presently being treated  I discussed with her some treatment options and again the pathophysiology of diabetes and insulin and insulin resistance and we have settled on her going to get labs prior to us making any decisions about treatment  She does have 2 documented A1c's over the level of 6.5 and she had a documented random blood sugar last year of 266 but no labs since then  She presently does not have any insurance coverage but her plan is to work with [facility] [employer] to make sure that she is able to get the resources that she needs  I have recommended to her that she schedule a Pap smear, mammogram, and colonoscopy, she was understanding of this and is hoping to have coverage for preventative services so that she might be able to do this  I have asked her to call with questions or concerns and follow-up in 1 month  Alarm and return precautions were discussed and accepted

## 2019-08-16 NOTE — PROGRESS NOTES
Family Medicine Follow-Up Progress Note  Patient: Abby Lopez  1955, 62 y.o., female  Encounter Date: 8/16/2019    ASSESSMENT & PLAN    ICD-10-CM ICD-9-CM    1. HTN (hypertension), benign C22 552.1 METABOLIC PANEL, COMPREHENSIVE      TSH 3RD GENERATION   2. Uncontrolled type 2 diabetes mellitus with hyperglycemia (HCC) N99.75 901.36 METABOLIC PANEL, COMPREHENSIVE      LIPID PANEL      HEMOGLOBIN A1C WITH EAG      CBC W/O DIFF   3. Class 2 severe obesity due to excess calories with serious comorbidity and body mass index (BMI) of 37.0 to 37.9 in adult (HCC) E66.01 278.01     Z68.37 V85.37    4.  Elevated TSH R79.89 794.5 TSH 3RD GENERATION       Orders Placed This Encounter    METABOLIC PANEL, COMPREHENSIVE    LIPID PANEL    HEMOGLOBIN A1C WITH EAG    CBC W/O DIFF    TSH 3RD GENERATION       Patient Instructions   Uncontrolled hypertension  Due for labs as ordered  Continue with nadolol, given her diabetic history we may need to consider using an ACE or an ARB  I have encouraged her to follow a healthy diet, and exercise moderate weight loss is recommended to decrease her cardiovascular risk factors and improve her cardiovascular fitness level  She had an abnormal TSH that was elevated in the past, I had an extensive discussion with her about the pathophysiology of thyroid disease and we will recheck her thyroid  It appears that she has uncontrolled type 2 diabetes that is not presently being treated  I discussed with her some treatment options and again the pathophysiology of diabetes and insulin and insulin resistance and we have settled on her going to get labs prior to us making any decisions about treatment  She does have 2 documented A1c's over the level of 6.5 and she had a documented random blood sugar last year of 266 but no labs since then  She presently does not have any insurance coverage but her plan is to work with [employer] to make sure that she is able to get the resources that she needs  I have recommended to her that she schedule a Pap smear, mammogram, and colonoscopy, she was understanding of this and is hoping to have coverage for preventative services so that she might be able to do this  I have asked her to call with questions or concerns and follow-up in 1 month  Alarm and return precautions were discussed and accepted      CHIEF COMPLAINT  Chief Complaint   Patient presents with    Hypertension     Follow up       Kimmie Cota is a 62 y.o. female presenting today for htn follow up, she has not been seen in over a year   her blood pressure is elevated today, however she reports that she checks at home  Usually sbp 130-140/70s Checking at home  She reports that she had some abnormal feelings once after having had caffeine once and had a bout of SVT and was given cardizem once but does not take that or anything else and this has not happened again  Today she denies any headache or vision changes, no chest pain or shortness of breath  She does have trouble losing weight  She was in the ER since last time she was seen by me one year ago  He has a history of diabetes and it appears that this is uncontrolled right now, she is not taking any medications and she was not aware of this diagnosis she reports  In the ER she had an abnormal thyroid study, she has been lost to follow-up and we had not recheck this, she reports this is a new finding to her and she was not aware that she had any thyroid dysfunction  She has not lost any weight since her last visit  She had an episode of SVT requiring adenosine, she reports that she was seen by cardiology and that this has not recurred  She was given Cardizem but she did not take it.   She does take the nadolol regularly  She reports that she has not had an opportunity to take care of some of the health maintenance activities because she does not currently have insurance but she does understand the necessity to do this and she is working on getting set up with an insurance program or with the care card  Today she has no nausea or vomiting, no diarrhea or constipation  She has no leg swelling, she has no trouble laying flat to sleep  She denies daytime sleepiness or fatigue    Review of Systems  A 12 point review of systems was negative except as noted here or in the HPI. OBJECTIVE  Visit Vitals  BP (!) 180/97 (BP 1 Location: Left arm, BP Patient Position: Sitting)   Pulse 72   Temp 97.6 °F (36.4 °C) (Oral)   Resp 18   Ht 5' 4\" (1.626 m)   Wt 218 lb 12.8 oz (99.2 kg)   LMP 06/30/2011   SpO2 100%   BMI 37.56 kg/m²       Physical Exam   Constitutional: She is oriented to person, place, and time. She appears well-developed and well-nourished. No distress. NAD, Nontoxic, Appears Stated Age, morbidly obese   HENT:   Head: Normocephalic and atraumatic. Mouth/Throat: Oropharynx is clear and moist.   Eyes: Conjunctivae and EOM are normal. Right eye exhibits no discharge. Left eye exhibits no discharge. No scleral icterus. Neck: Neck supple. No thyromegaly present. Cardiovascular: Normal rate, regular rhythm and normal heart sounds. No murmur heard. Pulmonary/Chest: Effort normal and breath sounds normal. No stridor. No respiratory distress. She has no wheezes. She has no rales. Abdominal: Soft. Bowel sounds are normal. She exhibits no distension. There is no tenderness. Musculoskeletal: She exhibits no edema or tenderness. Neurological: She is alert and oriented to person, place, and time. Grossly intact CN   Skin: Skin is warm and dry. No rash noted. She is not diaphoretic. Acanthosis nigricans noted at the base of the neck   Psychiatric: She has a normal mood and affect. Her behavior is normal.   Nursing note and vitals reviewed. No results found for any visits on 08/16/19.     HISTORICAL  Reviewed and updated today, and as noted below:    Past Medical History:   Diagnosis Date    Financial difficulties 11/23/2014    HTN (hypertension), benign 5/21/2012    Hypertension     Obesity 11/12/2012     Past Surgical History:   Procedure Laterality Date    CARDIAC SURG PROCEDURE UNLIST      cardiac cath    HX GYN      c section    HX OTHER SURGICAL      breast reduction     History reviewed. No pertinent family history. Social History     Tobacco Use   Smoking Status Never Smoker   Smokeless Tobacco Never Used     Social History     Socioeconomic History    Marital status: SINGLE     Spouse name: Not on file    Number of children: Not on file    Years of education: Not on file    Highest education level: Not on file   Tobacco Use    Smoking status: Never Smoker    Smokeless tobacco: Never Used   Substance and Sexual Activity    Alcohol use: No    Drug use: No    Sexual activity: Yes     Birth control/protection: None     Allergies   Allergen Reactions    Percocet [Oxycodone-Acetaminophen] Nausea Only    Darvocet A500 [Propoxyphene N-Acetaminophen] Other (comments)     delusional    Hydrochlorothiazide Other (comments)     Leg cramps    Lisinopril Cough       No visits with results within 3 Month(s) from this visit.    Latest known visit with results is:   Admission on 08/24/2018, Discharged on 08/24/2018   Component Date Value Ref Range Status    Ventricular Rate 08/24/2018 107  BPM Final    Atrial Rate 08/24/2018 107  BPM Final    P-R Interval 08/24/2018 128  ms Final    QRS Duration 08/24/2018 92  ms Final    Q-T Interval 08/24/2018 374  ms Final    QTC Calculation (Bezet) 08/24/2018 499  ms Final    Calculated P Axis 08/24/2018 46  degrees Final    Calculated R Axis 08/24/2018 -3  degrees Final    Calculated T Axis 08/24/2018 -30  degrees Final    Diagnosis 08/24/2018    Final                    Value:Sinus tachycardia with occasional premature ventricular complexes  Voltage criteria for left ventricular hypertrophy  Nonspecific ST and T wave abnormality  Abnormal ECG  When compared with ECG of 24-JUN-2014 18:19,  premature ventricular complexes are now present  Non-specific change in ST segment in Lateral leads  Inverted T waves have replaced nonspecific T wave abnormality in Inferior   leads  QT has lengthened  Confirmed by Parker Howard MD. ([id number]) on 8/28/2018 11:32:10 AM      SAMPLES BEING HELD 08/24/2018 LV. ARTURO   Final    COMMENT 08/24/2018 Add-on orders for these samples will be processed based on acceptable specimen integrity and analyte stability, which may vary by analyte. Final    Sodium 08/24/2018 137  136 - 145 mmol/L Final    Potassium 08/24/2018 4.0  3.5 - 5.1 mmol/L Final    Chloride 08/24/2018 101  97 - 108 mmol/L Final    CO2 08/24/2018 24  21 - 32 mmol/L Final    Anion gap 08/24/2018 12  5 - 15 mmol/L Final    Glucose 08/24/2018 266* 65 - 100 mg/dL Final    BUN 08/24/2018 13  6 - 20 MG/DL Final    Creatinine 08/24/2018 1.18* 0.55 - 1.02 MG/DL Final    BUN/Creatinine ratio 08/24/2018 11* 12 - 20   Final    GFR est AA 08/24/2018 56* >60 ml/min/1.73m2 Final    GFR est non-AA 08/24/2018 46* >60 ml/min/1.73m2 Final    Comment: Estimated GFR is calculated using the IDMS-traceable Modification of Diet in Renal Disease (MDRD) Study equation, reported for both  Americans (GFRAA) and non- Americans (GFRNA), and normalized to 1.73m2 body surface area. The physician must decide which value applies to the patient. The MDRD study equation should only be used in individuals age 25 or older. It has not been validated for the following: pregnant women, patients with serious comorbid conditions, or on certain medications, or persons with extremes of body size, muscle mass, or nutritional status.  Calcium 08/24/2018 8.6  8.5 - 10.1 MG/DL Final    TSH 08/24/2018 4.02* 0.36 - 3.74 uIU/mL Final    Comment: (NOTE)  Due to TSH heterogeneity, both structurally and degree of   glycosylation, monoclonal antibodies used in the TSH assay may not   accurately quantitate TSH.  Therefore, this result should be   correlated with clinical findings as well as with other assessments   of thyroid function, e.g., free T4, free T3.      Magnesium 08/24/2018 2.1  1.6 - 2.4 mg/dL Final    Troponin-I, Qt. 08/24/2018 <0.05  <0.05 ng/mL Final    Comment: The presence of detectable troponin above the reference range indicates myocardial injury which may be due to ischemia, myocarditis, trauma, etc.  Clinical correlation is necessary to establish the significance of this finding. Sequential testing is recommended to determine if the typical rise and fall of cTnI is demonstrated. Note:  Cardiac troponin I has a relatively long half life and may be present well after the CK MB has returned to baseline. The reference range is based on the 99th percentile of the referent population.  Troponin-I, Qt. 08/24/2018 0.20* <0.05 ng/mL Final    Comment: CALLED TO AND READ BACK BY  STEVE PERRY AT 2145/JS           Makenzie Kapoor MD  [facility]  08/16/19 8:31 AM    Portions of this note may have been populated using smart dictation software and may have \"sounds-like\" errors present. Pt was counseled on risks, benefits and alternatives of treatment options. All questions were asked and answered and the patient was agreeable with the treatment plan as outlined.

## 2019-08-18 ENCOUNTER — HOSPITAL ENCOUNTER (EMERGENCY)
Age: 64
Discharge: HOME | End: 2019-08-18
Attending: EMERGENCY MEDICINE
Payer: SELF-PAY

## 2019-08-18 VITALS
BODY MASS INDEX: 42.6 KG/M2 | DIASTOLIC BLOOD PRESSURE: 89 MMHG | RESPIRATION RATE: 18 BRPM | HEIGHT: 60 IN | TEMPERATURE: 98.6 F | HEART RATE: 85 BPM | WEIGHT: 217 LBS | OXYGEN SATURATION: 95 % | SYSTOLIC BLOOD PRESSURE: 175 MMHG

## 2019-08-18 DIAGNOSIS — R03.0 ELEVATED BLOOD PRESSURE READING: Primary | ICD-10-CM

## 2019-08-18 LAB
ALBUMIN SERPL-MCNC: 4 G/DL (ref 3.5–5)
ALBUMIN/GLOB SERPL: 1.1 {RATIO} (ref 1.1–2.2)
ALP SERPL-CCNC: 72 U/L (ref 45–117)
ALT SERPL-CCNC: 27 U/L (ref 12–78)
ANION GAP SERPL CALC-SCNC: 3 MMOL/L (ref 5–15)
APPEARANCE UR: CLEAR
AST SERPL-CCNC: 23 U/L (ref 15–37)
BACTERIA URNS QL MICRO: NEGATIVE /HPF
BASOPHILS # BLD: 0.1 K/UL (ref 0–0.1)
BASOPHILS NFR BLD: 1 % (ref 0–1)
BILIRUB SERPL-MCNC: 0.3 MG/DL (ref 0.2–1)
BILIRUB UR QL: NEGATIVE
BUN SERPL-MCNC: 14 MG/DL (ref 6–20)
BUN/CREAT SERPL: 17 (ref 12–20)
CALCIUM SERPL-MCNC: 8.9 MG/DL (ref 8.5–10.1)
CHLORIDE SERPL-SCNC: 106 MMOL/L (ref 97–108)
CO2 SERPL-SCNC: 30 MMOL/L (ref 21–32)
COLOR UR: ABNORMAL
COMMENT, HOLDF: NORMAL
CREAT SERPL-MCNC: 0.83 MG/DL (ref 0.55–1.02)
DIFFERENTIAL METHOD BLD: ABNORMAL
EOSINOPHIL # BLD: 0.3 K/UL (ref 0–0.4)
EOSINOPHIL NFR BLD: 4 % (ref 0–7)
EPITH CASTS URNS QL MICRO: ABNORMAL /LPF
ERYTHROCYTE [DISTWIDTH] IN BLOOD BY AUTOMATED COUNT: 15.4 % (ref 11.5–14.5)
GLOBULIN SER CALC-MCNC: 3.8 G/DL (ref 2–4)
GLUCOSE SERPL-MCNC: 129 MG/DL (ref 65–100)
GLUCOSE UR STRIP.AUTO-MCNC: NEGATIVE MG/DL
HCT VFR BLD AUTO: 38.5 % (ref 35–47)
HGB BLD-MCNC: 12 G/DL (ref 11.5–16)
HGB UR QL STRIP: NEGATIVE
IMM GRANULOCYTES # BLD AUTO: 0 K/UL (ref 0–0.04)
IMM GRANULOCYTES NFR BLD AUTO: 0 % (ref 0–0.5)
KETONES UR QL STRIP.AUTO: NEGATIVE MG/DL
LEUKOCYTE ESTERASE UR QL STRIP.AUTO: ABNORMAL
LYMPHOCYTES # BLD: 3.1 K/UL (ref 0.8–3.5)
LYMPHOCYTES NFR BLD: 45 % (ref 12–49)
MCH RBC QN AUTO: 25.8 PG (ref 26–34)
MCHC RBC AUTO-ENTMCNC: 31.2 G/DL (ref 30–36.5)
MCV RBC AUTO: 82.6 FL (ref 80–99)
MONOCYTES # BLD: 0.4 K/UL (ref 0–1)
MONOCYTES NFR BLD: 6 % (ref 5–13)
NEUTS SEG # BLD: 3 K/UL (ref 1.8–8)
NEUTS SEG NFR BLD: 44 % (ref 32–75)
NITRITE UR QL STRIP.AUTO: NEGATIVE
NRBC # BLD: 0 K/UL (ref 0–0.01)
NRBC BLD-RTO: 0 PER 100 WBC
PH UR STRIP: 6.5 [PH] (ref 5–8)
PLATELET # BLD AUTO: 273 K/UL (ref 150–400)
PMV BLD AUTO: 10.3 FL (ref 8.9–12.9)
POTASSIUM SERPL-SCNC: 3.8 MMOL/L (ref 3.5–5.1)
PROT SERPL-MCNC: 7.8 G/DL (ref 6.4–8.2)
PROT UR STRIP-MCNC: NEGATIVE MG/DL
RBC # BLD AUTO: 4.66 M/UL (ref 3.8–5.2)
RBC #/AREA URNS HPF: ABNORMAL /HPF (ref 0–5)
SAMPLES BEING HELD,HOLD: NORMAL
SODIUM SERPL-SCNC: 139 MMOL/L (ref 136–145)
SP GR UR REFRACTOMETRY: 1 (ref 1–1.03)
TROPONIN I SERPL-MCNC: <0.05 NG/ML
UA: UC IF INDICATED,UAUC: ABNORMAL
UROBILINOGEN UR QL STRIP.AUTO: 0.2 EU/DL (ref 0.2–1)
WBC # BLD AUTO: 6.8 K/UL (ref 3.6–11)
WBC URNS QL MICRO: ABNORMAL /HPF (ref 0–4)

## 2019-08-18 PROCEDURE — 81001 URINALYSIS AUTO W/SCOPE: CPT

## 2019-08-18 PROCEDURE — 99284 EMERGENCY DEPT VISIT MOD MDM: CPT

## 2019-08-18 PROCEDURE — 85025 COMPLETE CBC W/AUTO DIFF WBC: CPT

## 2019-08-18 PROCEDURE — 84484 ASSAY OF TROPONIN QUANT: CPT

## 2019-08-18 PROCEDURE — 80053 COMPREHEN METABOLIC PANEL: CPT

## 2019-08-18 PROCEDURE — 36415 COLL VENOUS BLD VENIPUNCTURE: CPT

## 2019-08-18 PROCEDURE — 93005 ELECTROCARDIOGRAM TRACING: CPT

## 2019-08-18 NOTE — ED PROVIDER NOTES
HPI       62y F here with high blood pressure. Wasn't feeling well this AM and it was 190/110's. After this, she took her BP meds. Again this afternoon says she is not feeling well but can't really describe sx's more than that. No trouble breathing. No fever. No chest pain. No stomach pain. No weakness or numbness. No  HA. ED EKG interpretation:  Rhythm: normal sinus rhythm; and regular . Rate (approx.): 95; Axis: normal; P wave: normal; QRS interval: normal ; ST/T wave: normal;  This EKG was interpreted by Yue Fritz MD,ED Provider. Past Medical History:   Diagnosis Date    Financial difficulties 11/23/2014    HTN (hypertension), benign 5/21/2012    Hypertension     Obesity 11/12/2012       Past Surgical History:   Procedure Laterality Date    CARDIAC SURG PROCEDURE UNLIST      cardiac cath    HX GYN      c section    HX OTHER SURGICAL      breast reduction         No family history on file.     Social History     Socioeconomic History    Marital status: SINGLE     Spouse name: Not on file    Number of children: Not on file    Years of education: Not on file    Highest education level: Not on file   Occupational History    Not on file   Social Needs    Financial resource strain: Not on file    Food insecurity:     Worry: Not on file     Inability: Not on file    Transportation needs:     Medical: Not on file     Non-medical: Not on file   Tobacco Use    Smoking status: Never Smoker    Smokeless tobacco: Never Used   Substance and Sexual Activity    Alcohol use: No    Drug use: No    Sexual activity: Yes     Birth control/protection: None   Lifestyle    Physical activity:     Days per week: Not on file     Minutes per session: Not on file    Stress: Not on file   Relationships    Social connections:     Talks on phone: Not on file     Gets together: Not on file     Attends Muslim service: Not on file     Active member of club or organization: Not on file     Attends meetings of clubs or organizations: Not on file     Relationship status: Not on file    Intimate partner violence:     Fear of current or ex partner: Not on file     Emotionally abused: Not on file     Physically abused: Not on file     Forced sexual activity: Not on file   Other Topics Concern    Not on file   Social History Narrative    Not on file         ALLERGIES: Percocet [oxycodone-acetaminophen]; Darvocet a500 [propoxyphene n-acetaminophen]; Hydrochlorothiazide; and Lisinopril    Review of Systems   Constitutional: Positive for activity change and fatigue. Respiratory: Negative for chest tightness and shortness of breath. Cardiovascular: Negative for chest pain, palpitations and leg swelling. Gastrointestinal: Negative for abdominal pain. All other systems reviewed and are negative. There were no vitals filed for this visit. Physical Exam   Constitutional: She is oriented to person, place, and time. She appears well-developed and well-nourished. HENT:   Head: Normocephalic and atraumatic. Eyes: Pupils are equal, round, and reactive to light. Conjunctivae and EOM are normal.   Neck: Normal range of motion. Neck supple. Cardiovascular: Normal rate. Pulmonary/Chest: Effort normal.   Abdominal: Soft. Musculoskeletal: Normal range of motion. Neurological: She is alert and oriented to person, place, and time. Skin: Skin is warm and dry. Psychiatric: She has a normal mood and affect. Nursing note and vitals reviewed.        MDM  Number of Diagnoses or Management Options  Elevated blood pressure reading:      Amount and/or Complexity of Data Reviewed  Clinical lab tests: ordered and reviewed    Risk of Complications, Morbidity, and/or Mortality  Presenting problems: moderate  Diagnostic procedures: moderate  Management options: moderate    Patient Progress  Patient progress: stable         Procedures

## 2019-08-18 NOTE — DISCHARGE INSTRUCTIONS
Patient Education        Elevated Blood Pressure: Care Instructions  Your Care Instructions    Blood pressure is a measure of how hard the blood pushes against the walls of your arteries. It's normal for blood pressure to go up and down throughout the day. But if it stays up over time, you have high blood pressure. Two numbers tell you your blood pressure. The first number is the systolic pressure. It shows how hard the blood pushes when your heart is pumping. The second number is the diastolic pressure. It shows how hard the blood pushes between heartbeats, when your heart is relaxed and filling with blood. An ideal blood pressure in adults is less than 120/80 (say \"120 over 80\"). High blood pressure is 140/90 or higher. You have high blood pressure if your top number is 140 or higher or your bottom number is 90 or higher, or both. The main test for high blood pressure is simple, fast, and painless. To diagnose high blood pressure, your doctor will test your blood pressure at different times. After testing your blood pressure, your doctor may ask you to test it again when you are home. If you are diagnosed with high blood pressure, you can work with your doctor to make a long-term plan to manage it. Follow-up care is a key part of your treatment and safety. Be sure to make and go to all appointments, and call your doctor if you are having problems. It's also a good idea to know your test results and keep a list of the medicines you take. How can you care for yourself at home? · Do not smoke. Smoking increases your risk for heart attack and stroke. If you need help quitting, talk to your doctor about stop-smoking programs and medicines. These can increase your chances of quitting for good. · Stay at a healthy weight. · Try to limit how much sodium you eat to less than 2,300 milligrams (mg) a day. Your doctor may ask you to try to eat less than 1,500 mg a day. · Be physically active.  Get at least 30 minutes of exercise on most days of the week. Walking is a good choice. You also may want to do other activities, such as running, swimming, cycling, or playing tennis or team sports. · Avoid or limit alcohol. Talk to your doctor about whether you can drink any alcohol. · Eat plenty of fruits, vegetables, and low-fat dairy products. Eat less saturated and total fats. · Learn how to check your blood pressure at home. When should you call for help? Call your doctor now or seek immediate medical care if:  ? · Your blood pressure is much higher than normal (such as 180/110 or higher). ? · You think high blood pressure is causing symptoms such as:  ¨ Severe headache. ¨ Blurry vision. ? Watch closely for changes in your health, and be sure to contact your doctor if:  ? · You do not get better as expected. Where can you learn more? Go to http://ashok-ian.info/. Enter H468 in the search box to learn more about \"Elevated Blood Pressure: Care Instructions. \"  Current as of: September 21, 2016  Content Version: 11.4  © 6257-1476 [employer]. Care instructions adapted under license by [employer] (which disclaims liability or warranty for this information). If you have questions about a medical condition or this instruction, always ask your healthcare professional. [address] any warranty or liability for your use of this information.

## 2019-08-18 NOTE — ED TRIAGE NOTES
Pt reports \"not feeling well\" when woke up and took BP which was elevated. PT took her metoprolol this morning. Hypertensive in triage.

## 2019-08-19 LAB
ATRIAL RATE: 95 BPM
CALCULATED P AXIS, ECG09: 46 DEGREES
CALCULATED R AXIS, ECG10: -3 DEGREES
CALCULATED T AXIS, ECG11: 9 DEGREES
DIAGNOSIS, 93000: NORMAL
P-R INTERVAL, ECG05: 150 MS
Q-T INTERVAL, ECG07: 358 MS
QRS DURATION, ECG06: 94 MS
QTC CALCULATION (BEZET), ECG08: 449 MS
VENTRICULAR RATE, ECG03: 95 BPM

## 2019-08-29 LAB
ALBUMIN SERPL-MCNC: 4.4 G/DL (ref 3.6–4.8)
ALBUMIN/GLOB SERPL: 1.7 {RATIO} (ref 1.2–2.2)
ALP SERPL-CCNC: 55 IU/L (ref 39–117)
ALT SERPL-CCNC: 28 IU/L (ref 0–32)
AST SERPL-CCNC: 27 IU/L (ref 0–40)
BILIRUB SERPL-MCNC: 0.3 MG/DL (ref 0–1.2)
BUN SERPL-MCNC: 10 MG/DL (ref 8–27)
BUN/CREAT SERPL: 12 (ref 12–28)
CALCIUM SERPL-MCNC: 9.6 MG/DL (ref 8.7–10.3)
CHLORIDE SERPL-SCNC: 98 MMOL/L (ref 96–106)
CHOLEST SERPL-MCNC: 138 MG/DL (ref 100–199)
CO2 SERPL-SCNC: 26 MMOL/L (ref 20–29)
CREAT SERPL-MCNC: 0.86 MG/DL (ref 0.57–1)
ERYTHROCYTE [DISTWIDTH] IN BLOOD BY AUTOMATED COUNT: 15.8 % (ref 12.3–15.4)
EST. AVERAGE GLUCOSE BLD GHB EST-MCNC: 169 MG/DL
GLOBULIN SER CALC-MCNC: 2.6 G/DL (ref 1.5–4.5)
GLUCOSE SERPL-MCNC: 135 MG/DL (ref 65–99)
HBA1C MFR BLD: 7.5 % (ref 4.8–5.6)
HCT VFR BLD AUTO: 37 % (ref 34–46.6)
HDLC SERPL-MCNC: 56 MG/DL
HGB BLD-MCNC: 11.7 G/DL (ref 11.1–15.9)
INTERPRETATION, 910389: NORMAL
LDLC SERPL CALC-MCNC: 69 MG/DL (ref 0–99)
Lab: NORMAL
MCH RBC QN AUTO: 24.9 PG (ref 26.6–33)
MCHC RBC AUTO-ENTMCNC: 31.6 G/DL (ref 31.5–35.7)
MCV RBC AUTO: 79 FL (ref 79–97)
PLATELET # BLD AUTO: 273 X10E3/UL (ref 150–450)
POTASSIUM SERPL-SCNC: 4.5 MMOL/L (ref 3.5–5.2)
PROT SERPL-MCNC: 7 G/DL (ref 6–8.5)
RBC # BLD AUTO: 4.69 X10E6/UL (ref 3.77–5.28)
SODIUM SERPL-SCNC: 137 MMOL/L (ref 134–144)
TRIGL SERPL-MCNC: 63 MG/DL (ref 0–149)
TSH SERPL DL<=0.005 MIU/L-ACNC: 2.22 UIU/ML (ref 0.45–4.5)
VLDLC SERPL CALC-MCNC: 13 MG/DL (ref 5–40)
WBC # BLD AUTO: 4.9 X10E3/UL (ref 3.4–10.8)

## 2019-08-29 NOTE — PROGRESS NOTES
As we suspected diabetes is not controlled, the A1c is firmly in the diabetic range and we need to initiate treatment, if possible lets move up the patient's appointment so that she may be seen sooner  The good news is her thyroid and lipids are within normal range  Her blood counts show no signs of anemia, no signs of infection, no signs of blood clotting disorders

## 2019-09-13 ENCOUNTER — OFFICE VISIT (OUTPATIENT)
Dept: FAMILY MEDICINE CLINIC | Age: 64
End: 2019-09-13

## 2019-09-13 VITALS
TEMPERATURE: 98.1 F | SYSTOLIC BLOOD PRESSURE: 142 MMHG | HEIGHT: 60 IN | WEIGHT: 214.6 LBS | HEART RATE: 84 BPM | RESPIRATION RATE: 18 BRPM | DIASTOLIC BLOOD PRESSURE: 88 MMHG | BODY MASS INDEX: 42.13 KG/M2 | OXYGEN SATURATION: 100 %

## 2019-09-13 DIAGNOSIS — E11.65 UNCONTROLLED TYPE 2 DIABETES MELLITUS WITH HYPERGLYCEMIA (HCC): ICD-10-CM

## 2019-09-13 DIAGNOSIS — I10 HTN (HYPERTENSION), BENIGN: Primary | ICD-10-CM

## 2019-09-13 DIAGNOSIS — E66.01 OBESITY, MORBID (HCC): ICD-10-CM

## 2019-09-13 RX ORDER — METFORMIN HYDROCHLORIDE 500 MG/1
500 TABLET ORAL 2 TIMES DAILY WITH MEALS
Qty: 60 TAB | Refills: 1 | Status: SHIPPED | OUTPATIENT
Start: 2019-09-13 | End: 2019-10-11

## 2019-09-13 NOTE — PATIENT INSTRUCTIONS
Lab Results   Component Value Date/Time    Hemoglobin A1c 7.5 (H) 08/28/2019 09:14 AM    Hemoglobin A1c 6.6 (H) 08/10/2015 11:54 AM    Hemoglobin A1c 7.0 (H) 06/17/2014 08:46 AM    Glucose 135 (H) 08/28/2019 09:14 AM    Glucose (POC) 108 (H) 06/24/2014 06:44 PM    Glucose (POC) 112 (H) 05/15/2012 06:53 AM    LDL, calculated 69 08/28/2019 09:14 AM    Creatinine (POC) 1.0 06/24/2014 06:44 PM    Creatinine 0.86 08/28/2019 09:14 AM     Here today in follow-up  She has made marvelous strides since her last visit  She has lost 4 pounds which I am very pleased to see, it seems she is following a healthier diet and has made some major lifestyle changes  I encouraged her to continue on this journey, to follow a diabetic friendly diet and to continue to lose weight. I encouraged her to add exercise 150 minutes/week of moderate intensity activity  Her A1c was found to be 7.5. In the past she had had one A1c above 7.0 in 2014 but otherwise she has come down before and I believe she will do this again through healthy lifestyle changes  In the intervening time while she is making her lifestyle changes we will add a new medicine called metformin. This medicine helps the insulin work at the level of the muscles to put away the sugar. She will start by taking this once daily at dinnertime for 2 weeks. As long as she is doing well with this medicine and I suspect that she will do great she will add it in the morning time at breakfast as well and we will recheck her A1c level in 3 months. If at that time her A1c is doing much better we can consider decreasing her medication dose and I understand her goal is to be off of medicine entirely and hopefully we will be able to do this within the next 1 year  Her blood pressure is considerably improved from prior readings.   She reports it is well controlled at home and she had a reading that was elevated when she arrived here however after some time a manual blood pressure reading of 142/88 was achieved  Again with blood pressure it is important to follow a healthy diet and exercise, particularly low sodium and high potassium diet to have been found to be helpful to control blood pressure  I encouraged her again to follow-up in about 1 month so we can make sure we know how she is doing and we can check in regularly and we will recheck labs at a 3-month interval from her last lab report  She should call my office if she has any questions or concerns

## 2019-09-13 NOTE — PROGRESS NOTES
Family Medicine Follow-Up Progress Note  Patient: Brisa Montgomery  1955, 62 y.o., female  Encounter Date: 9/13/2019    ASSESSMENT & PLAN    ICD-10-CM ICD-9-CM    1. HTN (hypertension), benign I10 401.1    2. Uncontrolled type 2 diabetes mellitus with hyperglycemia (HCC) E11.65 250.02    3. Obesity, morbid ([address] E66.01 278.01        Orders Placed This Encounter    metFORMIN (GLUCOPHAGE) 500 mg tablet     Sig: Take 1 Tab by mouth two (2) times daily (with meals). Dispense:  60 Tab     Refill:  1       Patient Instructions     Lab Results   Component Value Date/Time    Hemoglobin A1c 7.5 (H) 08/28/2019 09:14 AM    Hemoglobin A1c 6.6 (H) 08/10/2015 11:54 AM    Hemoglobin A1c 7.0 (H) 06/17/2014 08:46 AM    Glucose 135 (H) 08/28/2019 09:14 AM    Glucose (POC) 108 (H) 06/24/2014 06:44 PM    Glucose (POC) 112 (H) 05/15/2012 06:53 AM    LDL, calculated 69 08/28/2019 09:14 AM    Creatinine (POC) 1.0 06/24/2014 06:44 PM    Creatinine 0.86 08/28/2019 09:14 AM     Here today in follow-up  She has made marvelous strides since her last visit  She has lost 4 pounds which I am very pleased to see, it seems she is following a healthier diet and has made some major lifestyle changes  I encouraged her to continue on this journey, to follow a diabetic friendly diet and to continue to lose weight. I encouraged her to add exercise 150 minutes/week of moderate intensity activity  Her A1c was found to be 7.5. In the past she had had one A1c above 7.0 in 2014 but otherwise she has come down before and I believe she will do this again through healthy lifestyle changes  In the intervening time while she is making her lifestyle changes we will add a new medicine called metformin. This medicine helps the insulin work at the level of the muscles to put away the sugar. She will start by taking this once daily at dinnertime for 2 weeks.   As long as she is doing well with this medicine and I suspect that she will do great she will add it in the morning time at breakfast as well and we will recheck her A1c level in 3 months. If at that time her A1c is doing much better we can consider decreasing her medication dose and I understand her goal is to be off of medicine entirely and hopefully we will be able to do this within the next 1 year  Her blood pressure is considerably improved from prior readings. She reports it is well controlled at home and she had a reading that was elevated when she arrived here however after some time a manual blood pressure reading of 142/88 was achieved  Again with blood pressure it is important to follow a healthy diet and exercise, particularly low sodium and high potassium diet to have been found to be helpful to control blood pressure  I encouraged her again to follow-up in about 1 month so we can make sure we know how she is doing and we can check in regularly and we will recheck labs at a 3-month interval from her last lab report  She should call my office if she has any questions or concerns      CHIEF COMPLAINT  Chief Complaint   Patient presents with    Hypertension     Follow up       [address] [place] [address] Street is a 62 y.o. female presenting today for follow-up on multiple issues  Hypertension since her last visit she was seen in the emergency department for hypertension. She has been checking her blood pressure at home and she reports that her blood pressures have been in the 907 range systolic over 55J to 22P diastolic at home. She reports compliance with her medication with no side effects. She reports that she feels that stress and anxiety as well as poor diet and weight were contributing. Since her last visit she has changed her diet entirely and she is also lost 4 pounds and she continues on her journey.   She reports that her paige is strong and that she believes that if she is obedient and follows the laws of health and God that she will be able to eventually come off of all medications and do well  Diabetes: At her last visit she was diagnosed with diabetes. She is somewhat resistant to this diagnosis. She feels that her sugars are good and she reports to me that she was told in the emergency department that her sugars were good at that time. I agree with the assessment that her blood sugar in the emergency department in the 120s is an appropriate reading for a controlled diabetic however we have the A1c showing in the past that she was above 7 and again recently that she was at 7.5 meaning that her average blood glucose is probably somewhere around 170. With this in the amount of vascular damage that can be incurred with uncontrolled blood pressure and blood sugar she does understand the necessity for the use of medication however she holds firm in her believe that she will not need medication in the long-term so long as she follows a diabetic friendly diet, exercises and has a goal of weight loss. I applauded her determination in this effort  As noted above her weight is down 4 pounds since last visit  She is drinking more water, eating small infrequent meals, she has increased her fiber consumption and she is decreased her consumption of sugars, carbs, also meat she reports. She is eating far more legumes    Review of Systems  A 12 point review of systems was negative except as noted here or in the HPI. OBJECTIVE  Visit Vitals  /88   Pulse 84   Temp 98.1 °F (36.7 °C) (Oral)   Resp 18   Ht 5' (1.524 m)   Wt 214 lb 9.6 oz (97.3 kg)   LMP 06/30/2011   SpO2 100%   BMI 41.91 kg/m²       Physical Exam   Constitutional: She is oriented to person, place, and time. She appears well-developed and well-nourished. No distress. NAD, Nontoxic, Appears Stated Age, morbidly obese   HENT:   Head: Normocephalic and atraumatic. Mouth/Throat: Oropharynx is clear and moist.   Eyes: Conjunctivae and EOM are normal. Right eye exhibits no discharge. Left eye exhibits no discharge. No scleral icterus.    Neck: Neck supple. No thyromegaly present. Cardiovascular: Normal rate, regular rhythm and normal heart sounds. No murmur heard. Pulmonary/Chest: Effort normal and breath sounds normal. No stridor. No respiratory distress. She has no wheezes. She has no rales. Abdominal: Soft. Bowel sounds are normal. She exhibits no distension. There is no tenderness. There is no rebound and no guarding. Musculoskeletal: She exhibits no edema or tenderness. Neurological: She is alert and oriented to person, place, and time. Grossly intact CN   Skin: Skin is warm and dry. No rash noted. She is not diaphoretic. Acanthosis nigricans noted at the base of the neck   Psychiatric: She has a normal mood and affect. Her behavior is normal.   Nursing note and vitals reviewed. No results found for any visits on 09/13/19. HISTORICAL  Reviewed and updated today, and as noted below:    Past Medical History:   Diagnosis Date    Financial difficulties 11/23/2014    HTN (hypertension), benign 5/21/2012    Hypertension     Obesity 11/12/2012     Past Surgical History:   Procedure Laterality Date    CARDIAC SURG PROCEDURE UNLIST      cardiac cath    HX GYN      c section    HX OTHER SURGICAL      breast reduction     History reviewed. No pertinent family history.   Social History     Tobacco Use   Smoking Status Never Smoker   Smokeless Tobacco Never Used     Social History     Socioeconomic History    Marital status: SINGLE     Spouse name: Not on file    Number of children: Not on file    Years of education: Not on file    Highest education level: Not on file   Tobacco Use    Smoking status: Never Smoker    Smokeless tobacco: Never Used   Substance and Sexual Activity    Alcohol use: No    Drug use: No    Sexual activity: Yes     Birth control/protection: None     Allergies   Allergen Reactions    Percocet [Oxycodone-Acetaminophen] Nausea Only    Darvocet A500 [Propoxyphene N-Acetaminophen] Other (comments) delusional    Hydrochlorothiazide Other (comments)     Leg cramps    Lisinopril Cough       Admission on 08/18/2019, Discharged on 08/18/2019   Component Date Value Ref Range Status    WBC 08/18/2019 6.8  3.6 - 11.0 K/uL Final    RBC 08/18/2019 4.66  3.80 - 5.20 M/uL Final    HGB 08/18/2019 12.0  11.5 - 16.0 g/dL Final    HCT 08/18/2019 38.5  35.0 - 47.0 % Final    MCV 08/18/2019 82.6  80.0 - 99.0 FL Final    MCH 08/18/2019 25.8* 26.0 - 34.0 PG Final    MCHC 08/18/2019 31.2  30.0 - 36.5 g/dL Final    RDW 08/18/2019 15.4* 11.5 - 14.5 % Final    PLATELET 73/91/5091 588  150 - 400 K/uL Final    MPV 08/18/2019 10.3  8.9 - 12.9 FL Final    NRBC 08/18/2019 0.0  0  WBC Final    ABSOLUTE NRBC 08/18/2019 0.00  0.00 - 0.01 K/uL Final    NEUTROPHILS 08/18/2019 44  32 - 75 % Final    LYMPHOCYTES 08/18/2019 45  12 - 49 % Final    MONOCYTES 08/18/2019 6  5 - 13 % Final    EOSINOPHILS 08/18/2019 4  0 - 7 % Final    BASOPHILS 08/18/2019 1  0 - 1 % Final    IMMATURE GRANULOCYTES 08/18/2019 0  0.0 - 0.5 % Final    ABS. NEUTROPHILS 08/18/2019 3.0  1.8 - 8.0 K/UL Final    ABS. LYMPHOCYTES 08/18/2019 3.1  0.8 - 3.5 K/UL Final    ABS. MONOCYTES 08/18/2019 0.4  0.0 - 1.0 K/UL Final    ABS. EOSINOPHILS 08/18/2019 0.3  0.0 - 0.4 K/UL Final    ABS. BASOPHILS 08/18/2019 0.1  0.0 - 0.1 K/UL Final    ABS. IMM.  GRANS. 08/18/2019 0.0  0.00 - 0.04 K/UL Final    DF 08/18/2019 AUTOMATED    Final    Sodium 08/18/2019 139  136 - 145 mmol/L Final    Potassium 08/18/2019 3.8  3.5 - 5.1 mmol/L Final    Chloride 08/18/2019 106  97 - 108 mmol/L Final    CO2 08/18/2019 30  21 - 32 mmol/L Final    Anion gap 08/18/2019 3* 5 - 15 mmol/L Final    Glucose 08/18/2019 129* 65 - 100 mg/dL Final    BUN 08/18/2019 14  6 - 20 MG/DL Final    Creatinine 08/18/2019 0.83  0.55 - 1.02 MG/DL Final    BUN/Creatinine ratio 08/18/2019 17  12 - 20   Final    GFR est AA 08/18/2019 >60  >60 ml/min/1.73m2 Final    GFR est non-AA 08/18/2019 >60  >60 ml/min/1.73m2 Final    Comment: Estimated GFR is calculated using the IDMS-traceable Modification of Diet in Renal Disease (MDRD) Study equation, reported for both  Americans (GFRAA) and non- Americans (GFRNA), and normalized to 1.73m2 body surface area. The physician must decide which value applies to the patient. The MDRD study equation should only be used in individuals age 25 or older. It has not been validated for the following: pregnant women, patients with serious comorbid conditions, or on certain medications, or persons with extremes of body size, muscle mass, or nutritional status.  Calcium 08/18/2019 8.9  8.5 - 10.1 MG/DL Final    Bilirubin, total 08/18/2019 0.3  0.2 - 1.0 MG/DL Final    ALT (SGPT) 08/18/2019 27  12 - 78 U/L Final    AST (SGOT) 08/18/2019 23  15 - 37 U/L Final    Alk.  phosphatase 08/18/2019 72  45 - 117 U/L Final    Protein, total 08/18/2019 7.8  6.4 - 8.2 g/dL Final    Albumin 08/18/2019 4.0  3.5 - 5.0 g/dL Final    Globulin 08/18/2019 3.8  2.0 - 4.0 g/dL Final    A-G Ratio 08/18/2019 1.1  1.1 - 2.2   Final    Color 08/18/2019 YELLOW/STRAW    Final    Color Reference Range: Straw, Yellow or Dark Yellow    Appearance 08/18/2019 CLEAR  CLEAR   Final    Specific gravity 08/18/2019 1.005  1.003 - 1.030   Final    pH (UA) 08/18/2019 6.5  5.0 - 8.0   Final    Protein 08/18/2019 NEGATIVE   NEG mg/dL Final    Glucose 08/18/2019 NEGATIVE   NEG mg/dL Final    Ketone 08/18/2019 NEGATIVE   NEG mg/dL Final    Bilirubin 08/18/2019 NEGATIVE   NEG   Final    Blood 08/18/2019 NEGATIVE   NEG   Final    Urobilinogen 08/18/2019 0.2  0.2 - 1.0 EU/dL Final    Nitrites 08/18/2019 NEGATIVE   NEG   Final    Leukocyte Esterase 08/18/2019 SMALL* NEG   Final    WBC 08/18/2019 0-4  0 - 4 /hpf Final    RBC 08/18/2019 0-5  0 - 5 /hpf Final    Epithelial cells 08/18/2019 FEW  FEW /lpf Final    Epithelial cell category consists of squamous cells and /or transitional urothelial cells. Renal tubular cells, if present, are separately identified as such.  Bacteria 08/18/2019 NEGATIVE   NEG /hpf Final    UA:UC IF INDICATED 08/18/2019 CULTURE NOT INDICATED BY UA RESULT  CNI   Final    Ventricular Rate 08/18/2019 95  BPM Final    Atrial Rate 08/18/2019 95  BPM Final    P-R Interval 08/18/2019 150  ms Final    QRS Duration 08/18/2019 94  ms Final    Q-T Interval 08/18/2019 358  ms Final    QTC Calculation (Bezet) 08/18/2019 449  ms Final    Calculated P Axis 08/18/2019 46  degrees Final    Calculated R Axis 08/18/2019 -3  degrees Final    Calculated T Axis 08/18/2019 9  degrees Final    Diagnosis 08/18/2019    Final                    Value:Sinus rhythm with occasional premature ventricular complexes  Moderate voltage criteria for LVH, may be normal variant  Abnormal ECG  When compared with ECG of 24-AUG-2018 18:12,  T wave inversion no longer evident in Inferior leads  QT has shortened  Confirmed by Dara Mclean MD., [place] ([id number]) on 8/19/2019 11:57:13 AM      Troponin-I, Qt. 08/18/2019 <0.05  <0.05 ng/mL Final    Comment: The presence of detectable troponin above the reference range indicates myocardial injury which may be due to ischemia, myocarditis, trauma, etc.  Clinical correlation is necessary to establish the significance of this finding. Sequential testing is recommended to determine if the typical rise and fall of cTnI is demonstrated. Note:  Cardiac troponin I has a relatively long half life and may be present well after the CK MB has returned to baseline. The reference range is based on the 99th percentile of the referent population.  SAMPLES BEING HELD 08/18/2019 1SST,1RED,1BLU   Final    COMMENT 08/18/2019 Add-on orders for these samples will be processed based on acceptable specimen integrity and analyte stability, which may vary by analyte.     Final   Office Visit on 08/16/2019   Component Date Value Ref Range Status    Glucose 08/28/2019 135* 65 - 99 mg/dL Final    BUN 08/28/2019 10  8 - 27 mg/dL Final    Creatinine 08/28/2019 0.86  0.57 - 1.00 mg/dL Final    GFR est non-AA 08/28/2019 72  >59 mL/min/1.73 Final    GFR est AA 08/28/2019 83  >59 mL/min/1.73 Final    BUN/Creatinine ratio 08/28/2019 12  12 - 28 Final    Sodium 08/28/2019 137  134 - 144 mmol/L Final    Potassium 08/28/2019 4.5  3.5 - 5.2 mmol/L Final    Chloride 08/28/2019 98  96 - 106 mmol/L Final    CO2 08/28/2019 26  20 - 29 mmol/L Final    Calcium 08/28/2019 9.6  8.7 - 10.3 mg/dL Final    Protein, total 08/28/2019 7.0  6.0 - 8.5 g/dL Final    Albumin 08/28/2019 4.4  3.6 - 4.8 g/dL Final    GLOBULIN, TOTAL 08/28/2019 2.6  1.5 - 4.5 g/dL Final    A-G Ratio 08/28/2019 1.7  1.2 - 2.2 Final    Bilirubin, total 08/28/2019 0.3  0.0 - 1.2 mg/dL Final    Alk.  phosphatase 08/28/2019 55  39 - 117 IU/L Final    AST (SGOT) 08/28/2019 27  0 - 40 IU/L Final    ALT (SGPT) 08/28/2019 28  0 - 32 IU/L Final    Cholesterol, total 08/28/2019 138  100 - 199 mg/dL Final    Triglyceride 08/28/2019 63  0 - 149 mg/dL Final    HDL Cholesterol 08/28/2019 56  >39 mg/dL Final    VLDL, calculated 08/28/2019 13  5 - 40 mg/dL Final    LDL, calculated 08/28/2019 69  0 - 99 mg/dL Final    Hemoglobin A1c 08/28/2019 7.5* 4.8 - 5.6 % Final    Comment:          Prediabetes: 5.7 - 6.4           Diabetes: >6.4           Glycemic control for adults with diabetes: <7.0      Estimated average glucose 08/28/2019 169  mg/dL Final    WBC 08/28/2019 4.9  3.4 - 10.8 x10E3/uL Final    RBC 08/28/2019 4.69  3.77 - 5.28 x10E6/uL Final    HGB 08/28/2019 11.7  11.1 - 15.9 g/dL Final    HCT 08/28/2019 37.0  34.0 - 46.6 % Final    MCV 08/28/2019 79  79 - 97 fL Final    MCH 08/28/2019 24.9* 26.6 - 33.0 pg Final    MCHC 08/28/2019 31.6  31.5 - 35.7 g/dL Final    RDW 08/28/2019 15.8* 12.3 - 15.4 % Final    PLATELET 72/28/9144 557  150 - 450 x10E3/uL Final    TSH 08/28/2019 2.220  0.450 - 4.500 uIU/mL Final  INTERPRETATION 08/28/2019 Note   Final    Supplemental report is available.  PDF Image [id number] Not applicable   Final         Yasmeen Chavez MD  [address] [place] [address]  09/13/19 8:28 AM    Portions of this note may have been populated using smart dictation software and may have \"sounds-like\" errors present. Pt was counseled on risks, benefits and alternatives of treatment options. All questions were asked and answered and the patient was agreeable with the treatment plan as outlined. Encounter time today was >40 minutes and more than 50% of this encounter was spent in counseling face-to-face regarding Diagnosis, Patient Education, Medication Management, Compliance and Impressions.

## 2019-09-13 NOTE — PROGRESS NOTES
Chief Complaint   Patient presents with    Hypertension     Follow up     1. Have you been to the ER, urgent care clinic since your last visit? Hospitalized since your last visit? Yes 08/2019, [facility] Blood pressure. 2. Have you seen or consulted any other health care providers outside of the [address] [place] [address] since your last visit? Include any pap smears or colon screening. No    Provider notified patient's B/P 166/102.

## 2019-10-11 ENCOUNTER — OFFICE VISIT (OUTPATIENT)
Dept: FAMILY MEDICINE CLINIC | Age: 64
End: 2019-10-11

## 2019-10-11 VITALS
RESPIRATION RATE: 18 BRPM | TEMPERATURE: 98 F | WEIGHT: 206.8 LBS | SYSTOLIC BLOOD PRESSURE: 160 MMHG | HEIGHT: 60 IN | OXYGEN SATURATION: 100 % | BODY MASS INDEX: 40.6 KG/M2 | HEART RATE: 88 BPM | DIASTOLIC BLOOD PRESSURE: 96 MMHG

## 2019-10-11 DIAGNOSIS — E11.65 UNCONTROLLED TYPE 2 DIABETES MELLITUS WITH HYPERGLYCEMIA (HCC): ICD-10-CM

## 2019-10-11 DIAGNOSIS — I10 HTN (HYPERTENSION), BENIGN: Primary | ICD-10-CM

## 2019-10-11 DIAGNOSIS — E66.01 OBESITY, MORBID (HCC): ICD-10-CM

## 2019-10-11 DIAGNOSIS — Z12.39 SCREENING FOR BREAST CANCER: ICD-10-CM

## 2019-10-11 RX ORDER — LOSARTAN POTASSIUM 25 MG/1
50 TABLET ORAL DAILY
Qty: 180 TAB | Refills: 0 | Status: SHIPPED | OUTPATIENT
Start: 2019-10-11

## 2019-10-11 NOTE — PATIENT INSTRUCTIONS
Mrs. Naye Hammer is here today in follow-up  Her blood pressure is not adequately controlled. Because her A1c is elevated she and I have reviewed today the JNC 8 guidelines and I have recommended to her that we switch her to an ACE inhibitor or an ARB. She reported to me that in the past she was on a medicine that gave her a cough and so we have elected to start losartan for her at a 50 mg dose rather than leave her on the nadolol. Fortunately for her this will also improve her cost at the pharmacy because she currently does not have pharmacy coverage  She will take 50 mg dose daily with the option to increase to 100 mg if we are not controlled at our next visit  She has opted to stop taking diabetic medications. I have advised her that we may recheck her A1c at a 90-day interval from her last A1c and if she has made significant improvements perhaps we can stay the course but if not I will again strongly encourage her to take medication to treat her diabetes  She has lost 8 pounds since her last visit at my office.   I applauded her diligence and eating a healthy diet and increasing her exercise  She is due for a screening mammogram as ordered, other health maintenance items were declined as noted  I will see her back in 6 weeks or sooner on an as-needed basis and she will call with questions or concerns

## 2019-10-11 NOTE — PROGRESS NOTES
Family Medicine Follow-Up Progress Note  Patient: Ralph Craft  1955, 62 y.o., female  Encounter Date: 10/11/2019    ASSESSMENT & PLAN    ICD-10-CM ICD-9-CM    1. HTN (hypertension), benign I10 401.1 losartan (COZAAR) 25 mg tablet   2. Screening for breast cancer Z12.39 V76.10 MAURICE MAMMO BI SCREENING INCL CAD   3. Uncontrolled type 2 diabetes mellitus with hyperglycemia (HCC) E11.65 250.02    4. Obesity, morbid ([address] E66.01 278.01        Orders Placed This Encounter    MAURICE MAMMO BI SCREENING INCL CAD     Standing Status:   Future     Standing Expiration Date:   4/12/2020     Order Specific Question:   Reason for Exam     Answer:   screening    losartan (COZAAR) 25 mg tablet     Sig: Take 2 Tabs by mouth daily. Dispense:  180 Tab     Refill:  0       Patient Instructions   Mrs. Popeye Burns is here today in follow-up  Her blood pressure is not adequately controlled. Because her A1c is elevated she and I have reviewed today the JNC 8 guidelines and I have recommended to her that we switch her to an ACE inhibitor or an ARB. She reported to me that in the past she was on a medicine that gave her a cough and so we have elected to start losartan for her at a 50 mg dose rather than leave her on the nadolol. Fortunately for her this will also improve her cost at the pharmacy because she currently does not have pharmacy coverage  She will take 50 mg dose daily with the option to increase to 100 mg if we are not controlled at our next visit  She has opted to stop taking diabetic medications. I have advised her that we may recheck her A1c at a 90-day interval from her last A1c and if she has made significant improvements perhaps we can stay the course but if not I will again strongly encourage her to take medication to treat her diabetes  She has lost 8 pounds since her last visit at my office.   I applauded her diligence and eating a healthy diet and increasing her exercise  She is due for a screening mammogram as ordered, other health maintenance items were declined as noted  I will see her back in 6 weeks or sooner on an as-needed basis and she will call with questions or concerns      CHIEF COMPLAINT  Chief Complaint   Patient presents with    Hypertension     rimma Davison is a 62 y.o. female presenting today for follow-up. Has brought her blood sugar logs. She stopped taking her metformin because she noticed that on days that she took it her blood sugars were higher and on days that she did not take it her blood sugars were lower. She has been diligently tracking and her blood sugars seem to be ranging in the 100-160 range. She would like to be off of medication for now because she has made a number of lifestyle changes and she believes that her lifestyle changes combined with her paige will bring her diabetes back into controlled range  She denies vaccinations  She has been taking her nadolol, she believes that it is working and she reports to me her blood pressure yesterday was 120s over 70s at home. She did have a death in her home yesterday, a good friend of hers was on home hospice at the patient's home. She reports that this was a good home going  She was previously on a blood pressure medicine that made her cough, I suspect this may have been lisinopril. She also reports that she tried a little pink pill that was designed to be used in people of her race, I suspect this was may be hydrochlorothiazide. She wants to be educated on the reasons why each of the medications would be used and what they could protect and also what side effects they could cause and so we have had a long discussion on this today  She is pleased to show that she is lost 8 pounds since our last visit and she reports she is eating healthier, treating her body as a temple and walking    Review of Systems  A 12 point review of systems was negative except as noted here or in the HPI.     OBJECTIVE  Visit Vitals  BP (!) 160/96   Pulse 88   Temp 98 °F (36.7 °C) (Oral)   Resp 18   Ht 5' (1.524 m)   Wt 206 lb 12.8 oz (93.8 kg)   LMP 06/30/2011   SpO2 100%   BMI 40.39 kg/m²       Physical Exam   Constitutional: She is oriented to person, place, and time. She appears well-developed and well-nourished. No distress. NAD, Nontoxic, Appears Stated Age, morbidly obese   HENT:   Head: Normocephalic and atraumatic. Mouth/Throat: Oropharynx is clear and moist.   Eyes: Conjunctivae and EOM are normal. Right eye exhibits no discharge. Left eye exhibits no discharge. No scleral icterus. Neck: Neck supple. No thyromegaly present. Cardiovascular: Normal rate, regular rhythm and normal heart sounds. No murmur heard. Pulmonary/Chest: Effort normal and breath sounds normal. No stridor. No respiratory distress. She has no wheezes. She has no rales. Abdominal: Soft. Bowel sounds are normal. She exhibits no distension. There is no tenderness. There is no rebound and no guarding. Musculoskeletal: She exhibits no edema or tenderness. Neurological: She is alert and oriented to person, place, and time. Grossly intact CN   Skin: Skin is warm and dry. No rash noted. She is not diaphoretic. Acanthosis nigricans noted at the base of the neck   Psychiatric: She has a normal mood and affect. Her behavior is normal.   Nursing note and vitals reviewed. No results found for any visits on 10/11/19. HISTORICAL  Reviewed and updated today, and as noted below:    Past Medical History:   Diagnosis Date    Financial difficulties 11/23/2014    HTN (hypertension), benign 5/21/2012    Hypertension     Obesity 11/12/2012     Past Surgical History:   Procedure Laterality Date    CARDIAC SURG PROCEDURE UNLIST      cardiac cath    HX GYN      c section    HX OTHER SURGICAL      breast reduction     History reviewed. No pertinent family history.   Social History     Tobacco Use   Smoking Status Never Smoker   Smokeless Tobacco Never Used     Social History     Socioeconomic History    Marital status: SINGLE     Spouse name: Not on file    Number of children: Not on file    Years of education: Not on file    Highest education level: Not on file   Tobacco Use    Smoking status: Never Smoker    Smokeless tobacco: Never Used   Substance and Sexual Activity    Alcohol use: No    Drug use: No    Sexual activity: Yes     Birth control/protection: None     Allergies   Allergen Reactions    Percocet [Oxycodone-Acetaminophen] Nausea Only    Darvocet A500 [Propoxyphene N-Acetaminophen] Other (comments)     delusional    Hydrochlorothiazide Other (comments)     Leg cramps    Lisinopril Cough       Admission on 08/18/2019, Discharged on 08/18/2019   Component Date Value Ref Range Status    WBC 08/18/2019 6.8  3.6 - 11.0 K/uL Final    RBC 08/18/2019 4.66  3.80 - 5.20 M/uL Final    HGB 08/18/2019 12.0  11.5 - 16.0 g/dL Final    HCT 08/18/2019 38.5  35.0 - 47.0 % Final    MCV 08/18/2019 82.6  80.0 - 99.0 FL Final    MCH 08/18/2019 25.8* 26.0 - 34.0 PG Final    MCHC 08/18/2019 31.2  30.0 - 36.5 g/dL Final    RDW 08/18/2019 15.4* 11.5 - 14.5 % Final    PLATELET 03/81/7887 141  150 - 400 K/uL Final    MPV 08/18/2019 10.3  8.9 - 12.9 FL Final    NRBC 08/18/2019 0.0  0  WBC Final    ABSOLUTE NRBC 08/18/2019 0.00  0.00 - 0.01 K/uL Final    NEUTROPHILS 08/18/2019 44  32 - 75 % Final    LYMPHOCYTES 08/18/2019 45  12 - 49 % Final    MONOCYTES 08/18/2019 6  5 - 13 % Final    EOSINOPHILS 08/18/2019 4  0 - 7 % Final    BASOPHILS 08/18/2019 1  0 - 1 % Final    IMMATURE GRANULOCYTES 08/18/2019 0  0.0 - 0.5 % Final    ABS. NEUTROPHILS 08/18/2019 3.0  1.8 - 8.0 K/UL Final    ABS. LYMPHOCYTES 08/18/2019 3.1  0.8 - 3.5 K/UL Final    ABS. MONOCYTES 08/18/2019 0.4  0.0 - 1.0 K/UL Final    ABS. EOSINOPHILS 08/18/2019 0.3  0.0 - 0.4 K/UL Final    ABS. BASOPHILS 08/18/2019 0.1  0.0 - 0.1 K/UL Final    ABS. IMM.  GRANS. 08/18/2019 0.0  0.00 - 0.04 K/UL Final    DF 08/18/2019 AUTOMATED    Final    Sodium 08/18/2019 139  136 - 145 mmol/L Final    Potassium 08/18/2019 3.8  3.5 - 5.1 mmol/L Final    Chloride 08/18/2019 106  97 - 108 mmol/L Final    CO2 08/18/2019 30  21 - 32 mmol/L Final    Anion gap 08/18/2019 3* 5 - 15 mmol/L Final    Glucose 08/18/2019 129* 65 - 100 mg/dL Final    BUN 08/18/2019 14  6 - 20 MG/DL Final    Creatinine 08/18/2019 0.83  0.55 - 1.02 MG/DL Final    BUN/Creatinine ratio 08/18/2019 17  12 - 20   Final    GFR est AA 08/18/2019 >60  >60 ml/min/1.73m2 Final    GFR est non-AA 08/18/2019 >60  >60 ml/min/1.73m2 Final    Comment: Estimated GFR is calculated using the IDMS-traceable Modification of Diet in Renal Disease (MDRD) Study equation, reported for both  Americans (GFRAA) and non- Americans (GFRNA), and normalized to 1.73m2 body surface area. The physician must decide which value applies to the patient. The MDRD study equation should only be used in individuals age 25 or older. It has not been validated for the following: pregnant women, patients with serious comorbid conditions, or on certain medications, or persons with extremes of body size, muscle mass, or nutritional status.  Calcium 08/18/2019 8.9  8.5 - 10.1 MG/DL Final    Bilirubin, total 08/18/2019 0.3  0.2 - 1.0 MG/DL Final    ALT (SGPT) 08/18/2019 27  12 - 78 U/L Final    AST (SGOT) 08/18/2019 23  15 - 37 U/L Final    Alk.  phosphatase 08/18/2019 72  45 - 117 U/L Final    Protein, total 08/18/2019 7.8  6.4 - 8.2 g/dL Final    Albumin 08/18/2019 4.0  3.5 - 5.0 g/dL Final    Globulin 08/18/2019 3.8  2.0 - 4.0 g/dL Final    A-G Ratio 08/18/2019 1.1  1.1 - 2.2   Final    Color 08/18/2019 YELLOW/STRAW    Final    Color Reference Range: Straw, Yellow or Dark Yellow    Appearance 08/18/2019 CLEAR  CLEAR   Final    Specific gravity 08/18/2019 1.005  1.003 - 1.030   Final    pH (UA) 08/18/2019 6.5  5.0 - 8.0   Final    Protein 08/18/2019 NEGATIVE   NEG mg/dL Final    Glucose 08/18/2019 NEGATIVE   NEG mg/dL Final    Ketone 08/18/2019 NEGATIVE   NEG mg/dL Final    Bilirubin 08/18/2019 NEGATIVE   NEG   Final    Blood 08/18/2019 NEGATIVE   NEG   Final    Urobilinogen 08/18/2019 0.2  0.2 - 1.0 EU/dL Final    Nitrites 08/18/2019 NEGATIVE   NEG   Final    Leukocyte Esterase 08/18/2019 SMALL* NEG   Final    WBC 08/18/2019 0-4  0 - 4 /hpf Final    RBC 08/18/2019 0-5  0 - 5 /hpf Final    Epithelial cells 08/18/2019 FEW  FEW /lpf Final    Epithelial cell category consists of squamous cells and /or transitional urothelial cells. Renal tubular cells, if present, are separately identified as such.  Bacteria 08/18/2019 NEGATIVE   NEG /hpf Final    UA:UC IF INDICATED 08/18/2019 CULTURE NOT INDICATED BY UA RESULT  CNI   Final    Ventricular Rate 08/18/2019 95  BPM Final    Atrial Rate 08/18/2019 95  BPM Final    P-R Interval 08/18/2019 150  ms Final    QRS Duration 08/18/2019 94  ms Final    Q-T Interval 08/18/2019 358  ms Final    QTC Calculation (Bezet) 08/18/2019 449  ms Final    Calculated P Axis 08/18/2019 46  degrees Final    Calculated R Axis 08/18/2019 -3  degrees Final    Calculated T Axis 08/18/2019 9  degrees Final    Diagnosis 08/18/2019    Final                    Value:Sinus rhythm with occasional premature ventricular complexes  Moderate voltage criteria for LVH, may be normal variant  Abnormal ECG  When compared with ECG of 24-AUG-2018 18:12,  T wave inversion no longer evident in Inferior leads  QT has shortened  Confirmed by Paula Grajeda MD., [place] ([id number]) on 8/19/2019 11:57:13 AM      Troponin-I, Qt. 08/18/2019 <0.05  <0.05 ng/mL Final    Comment: The presence of detectable troponin above the reference range indicates myocardial injury which may be due to ischemia, myocarditis, trauma, etc.  Clinical correlation is necessary to establish the significance of this finding.   Sequential testing is recommended to determine if the typical rise and fall of cTnI is demonstrated. Note:  Cardiac troponin I has a relatively long half life and may be present well after the CK MB has returned to baseline. The reference range is based on the 99th percentile of the referent population.  SAMPLES BEING HELD 08/18/2019 1SST,1RED,1BLU   Final    COMMENT 08/18/2019 Add-on orders for these samples will be processed based on acceptable specimen integrity and analyte stability, which may vary by analyte. Final   Office Visit on 08/16/2019   Component Date Value Ref Range Status    Glucose 08/28/2019 135* 65 - 99 mg/dL Final    BUN 08/28/2019 10  8 - 27 mg/dL Final    Creatinine 08/28/2019 0.86  0.57 - 1.00 mg/dL Final    GFR est non-AA 08/28/2019 72  >59 mL/min/1.73 Final    GFR est AA 08/28/2019 83  >59 mL/min/1.73 Final    BUN/Creatinine ratio 08/28/2019 12  12 - 28 Final    Sodium 08/28/2019 137  134 - 144 mmol/L Final    Potassium 08/28/2019 4.5  3.5 - 5.2 mmol/L Final    Chloride 08/28/2019 98  96 - 106 mmol/L Final    CO2 08/28/2019 26  20 - 29 mmol/L Final    Calcium 08/28/2019 9.6  8.7 - 10.3 mg/dL Final    Protein, total 08/28/2019 7.0  6.0 - 8.5 g/dL Final    Albumin 08/28/2019 4.4  3.6 - 4.8 g/dL Final    GLOBULIN, TOTAL 08/28/2019 2.6  1.5 - 4.5 g/dL Final    A-G Ratio 08/28/2019 1.7  1.2 - 2.2 Final    Bilirubin, total 08/28/2019 0.3  0.0 - 1.2 mg/dL Final    Alk.  phosphatase 08/28/2019 55  39 - 117 IU/L Final    AST (SGOT) 08/28/2019 27  0 - 40 IU/L Final    ALT (SGPT) 08/28/2019 28  0 - 32 IU/L Final    Cholesterol, total 08/28/2019 138  100 - 199 mg/dL Final    Triglyceride 08/28/2019 63  0 - 149 mg/dL Final    HDL Cholesterol 08/28/2019 56  >39 mg/dL Final    VLDL, calculated 08/28/2019 13  5 - 40 mg/dL Final    LDL, calculated 08/28/2019 69  0 - 99 mg/dL Final    Hemoglobin A1c 08/28/2019 7.5* 4.8 - 5.6 % Final    Comment:          Prediabetes: 5.7 - 6.4           Diabetes: >6.4           Glycemic control for adults with diabetes: <7.0      Estimated average glucose 08/28/2019 169  mg/dL Final    WBC 08/28/2019 4.9  3.4 - 10.8 x10E3/uL Final    RBC 08/28/2019 4.69  3.77 - 5.28 x10E6/uL Final    HGB 08/28/2019 11.7  11.1 - 15.9 g/dL Final    HCT 08/28/2019 37.0  34.0 - 46.6 % Final    MCV 08/28/2019 79  79 - 97 fL Final    MCH 08/28/2019 24.9* 26.6 - 33.0 pg Final    MCHC 08/28/2019 31.6  31.5 - 35.7 g/dL Final    RDW 08/28/2019 15.8* 12.3 - 15.4 % Final    PLATELET 52/28/2052 206  150 - 450 x10E3/uL Final    TSH 08/28/2019 2.220  0.450 - 4.500 uIU/mL Final    INTERPRETATION 08/28/2019 Note   Final    Supplemental report is available.  PDF Image 23/25/2194 Not applicable   Final         Davidson Lama MD  [address] [place] [address]  10/11/19 10:16 AM    Portions of this note may have been populated using smart dictation software and may have \"sounds-like\" errors present. Pt was counseled on risks, benefits and alternatives of treatment options. All questions were asked and answered and the patient was agreeable with the treatment plan as outlined. Encounter time today was >35 minutes and more than 50% of this encounter was spent in counseling face-to-face regarding Diagnosis, Patient Education, Medication Management, Compliance and Impressions.

## 2019-10-11 NOTE — PROGRESS NOTES
Chief Complaint   Patient presents with    Hypertension     ollow up     1. Have you been to the ER, urgent care clinic since your last visit? Hospitalized since your last visit? No    2. Have you seen or consulted any other health care providers outside of the [address] [place] [address] since your last visit? Include any pap smears or colon screening.  No

## 2019-10-14 ENCOUNTER — TELEPHONE (OUTPATIENT)
Dept: FAMILY MEDICINE CLINIC | Age: 64
End: 2019-10-14

## 2019-10-14 NOTE — TELEPHONE ENCOUNTER
Spoke with pt. Per pt since starting th Losartan 50 MG she has had elevated heart rate and some chest discomfort. Per pt it is not continuous however, she does not like the way it makes her feel, and they only thing that has changed is her blood pressure medication. Pt would like to stop the Losartan and start back on Nadolol, tomorrow.

## 2019-10-14 NOTE — TELEPHONE ENCOUNTER
----- Message from Seth Nicholson sent at 10/14/2019  3:47 PM EDT -----  Regarding: Dr. Alex Kyle  Patient return call    Caller's first and last name and relationship (if not the patient):Pt      Best contact number(s):[phone]      Whose call is being returned:Unsure      Details to clarify the request:      Seth Nicholson

## 2019-10-14 NOTE — TELEPHONE ENCOUNTER
Per my conversation with karri:  Changed to ARB because of concern she did not tolerate ACE in the past but working to follow standard of care with being on a BB alone with DM is not the recommendation. Perhaps she should be on both a BB and losartan.  She can cut her Losartan in half for now if she feels this is a side effect but should have a visit to discuss the options and ensure we are following standard of care

## 2019-10-14 NOTE — TELEPHONE ENCOUNTER
Pt requesting call back to advise if she can change back to her original blood pressure medication, stating the new medication causes discomfort in her chest and irregular heartbeat. Pt transferred to nurse.  Lyly

## 2019-10-14 NOTE — TELEPHONE ENCOUNTER
Called pt, and left a voice message, advising that Dr. Liyah Herron is going to want to her see her for an appointment to discuss blood pressure medication and the need to take medications that both protect her kidneys and keep her blood pressure stabilized. Advised pt to call office back to schedule.

## 2020-03-31 ENCOUNTER — PATIENT MESSAGE (OUTPATIENT)
Dept: FAMILY MEDICINE CLINIC | Age: 65
End: 2020-03-31

## 2022-03-18 PROBLEM — E66.01 OBESITY, MORBID (HCC): Status: ACTIVE | Noted: 2018-07-11

## 2022-08-16 ENCOUNTER — HOSPITAL ENCOUNTER (EMERGENCY)
Age: 67
Discharge: HOME | End: 2022-08-16
Attending: EMERGENCY MEDICINE
Payer: MEDICARE

## 2022-08-16 VITALS
TEMPERATURE: 98.5 F | BODY MASS INDEX: 36.72 KG/M2 | WEIGHT: 188 LBS | RESPIRATION RATE: 18 BRPM | DIASTOLIC BLOOD PRESSURE: 90 MMHG | OXYGEN SATURATION: 94 % | HEART RATE: 93 BPM | SYSTOLIC BLOOD PRESSURE: 144 MMHG

## 2022-08-16 DIAGNOSIS — R74.01 TRANSAMINITIS: ICD-10-CM

## 2022-08-16 DIAGNOSIS — I47.1 PAROXYSMAL SVT (SUPRAVENTRICULAR TACHYCARDIA) (HCC): Primary | ICD-10-CM

## 2022-08-16 LAB
ALBUMIN SERPL-MCNC: 3.7 G/DL (ref 3.5–5)
ALBUMIN/GLOB SERPL: 1.1 {RATIO} (ref 1.1–2.2)
ALP SERPL-CCNC: 80 U/L (ref 45–117)
ALT SERPL-CCNC: 764 U/L (ref 12–78)
ANION GAP SERPL CALC-SCNC: 9 MMOL/L (ref 5–15)
AST SERPL-CCNC: 597 U/L (ref 15–37)
BASOPHILS # BLD: 0.1 K/UL (ref 0–0.1)
BASOPHILS NFR BLD: 1 % (ref 0–1)
BILIRUB SERPL-MCNC: 0.3 MG/DL (ref 0.2–1)
BUN SERPL-MCNC: 25 MG/DL (ref 6–20)
BUN/CREAT SERPL: 22 (ref 12–20)
CALCIUM SERPL-MCNC: 9 MG/DL (ref 8.5–10.1)
CHLORIDE SERPL-SCNC: 104 MMOL/L (ref 97–108)
CO2 SERPL-SCNC: 28 MMOL/L (ref 21–32)
COMMENT, HOLDF: NORMAL
CREAT SERPL-MCNC: 1.12 MG/DL (ref 0.55–1.02)
DIFFERENTIAL METHOD BLD: ABNORMAL
EOSINOPHIL # BLD: 0.1 K/UL (ref 0–0.4)
EOSINOPHIL NFR BLD: 2 % (ref 0–7)
ERYTHROCYTE [DISTWIDTH] IN BLOOD BY AUTOMATED COUNT: 15.7 % (ref 11.5–14.5)
GLOBULIN SER CALC-MCNC: 3.4 G/DL (ref 2–4)
GLUCOSE SERPL-MCNC: 155 MG/DL (ref 65–100)
HCT VFR BLD AUTO: 36.6 % (ref 35–47)
HGB BLD-MCNC: 11.6 G/DL (ref 11.5–16)
IMM GRANULOCYTES # BLD AUTO: 0 K/UL (ref 0–0.04)
IMM GRANULOCYTES NFR BLD AUTO: 1 % (ref 0–0.5)
LYMPHOCYTES # BLD: 1.7 K/UL (ref 0.8–3.5)
LYMPHOCYTES NFR BLD: 22 % (ref 12–49)
MAGNESIUM SERPL-MCNC: 2.2 MG/DL (ref 1.6–2.4)
MCH RBC QN AUTO: 25.4 PG (ref 26–34)
MCHC RBC AUTO-ENTMCNC: 31.7 G/DL (ref 30–36.5)
MCV RBC AUTO: 80.3 FL (ref 80–99)
MONOCYTES # BLD: 0.3 K/UL (ref 0–1)
MONOCYTES NFR BLD: 4 % (ref 5–13)
NEUTS SEG # BLD: 5.3 K/UL (ref 1.8–8)
NEUTS SEG NFR BLD: 70 % (ref 32–75)
NRBC # BLD: 0 K/UL (ref 0–0.01)
NRBC BLD-RTO: 0 PER 100 WBC
PLATELET # BLD AUTO: 197 K/UL (ref 150–400)
PMV BLD AUTO: 11.2 FL (ref 8.9–12.9)
POTASSIUM SERPL-SCNC: 3.5 MMOL/L (ref 3.5–5.1)
PROT SERPL-MCNC: 7.1 G/DL (ref 6.4–8.2)
RBC # BLD AUTO: 4.56 M/UL (ref 3.8–5.2)
SAMPLES BEING HELD,HOLD: NORMAL
SODIUM SERPL-SCNC: 141 MMOL/L (ref 136–145)
WBC # BLD AUTO: 7.5 K/UL (ref 3.6–11)

## 2022-08-16 PROCEDURE — 93005 ELECTROCARDIOGRAM TRACING: CPT

## 2022-08-16 PROCEDURE — 85025 COMPLETE CBC W/AUTO DIFF WBC: CPT

## 2022-08-16 PROCEDURE — 36415 COLL VENOUS BLD VENIPUNCTURE: CPT

## 2022-08-16 PROCEDURE — 99284 EMERGENCY DEPT VISIT MOD MDM: CPT

## 2022-08-16 PROCEDURE — 74011250637 HC RX REV CODE- 250/637: Performed by: EMERGENCY MEDICINE

## 2022-08-16 PROCEDURE — 80053 COMPREHEN METABOLIC PANEL: CPT

## 2022-08-16 PROCEDURE — 83735 ASSAY OF MAGNESIUM: CPT

## 2022-08-16 RX ORDER — DILTIAZEM HYDROCHLORIDE 30 MG/1
30 TABLET, FILM COATED ORAL
Status: COMPLETED | OUTPATIENT
Start: 2022-08-16 | End: 2022-08-16

## 2022-08-16 RX ORDER — DILTIAZEM HYDROCHLORIDE 30 MG/1
30 TABLET, FILM COATED ORAL 3 TIMES DAILY
Qty: 90 TABLET | Refills: 0 | Status: SHIPPED | OUTPATIENT
Start: 2022-08-16 | End: 2022-09-15

## 2022-08-16 RX ADMIN — DILTIAZEM HYDROCHLORIDE 30 MG: 30 TABLET, FILM COATED ORAL at 21:27

## 2022-08-17 LAB
ATRIAL RATE: 97 BPM
CALCULATED P AXIS, ECG09: 33 DEGREES
CALCULATED T AXIS, ECG11: 50 DEGREES
DIAGNOSIS, 93000: NORMAL
P-R INTERVAL, ECG05: 146 MS
Q-T INTERVAL, ECG07: 352 MS
QRS DURATION, ECG06: 102 MS
QTC CALCULATION (BEZET), ECG08: 447 MS
VENTRICULAR RATE, ECG03: 97 BPM

## 2022-08-17 NOTE — ED PROVIDER NOTES
10:12 PM  Change of shift. Care of patient taken over from Dr. Juan Quiñonez; H&P reviewed, bedside handoff complete. Awaiting diagnostics    VITAL SIGNS:  Patient Vitals for the past 4 hrs:   Temp Pulse Resp BP SpO2   08/16/22 2117 -- 95 -- -- --   08/16/22 2116 98.5 °F (36.9 °C) (!) 101 18 (!) 160/96 95 %         LABS:  Recent Results (from the past 6 hour(s))   CBC WITH AUTOMATED DIFF    Collection Time: 08/16/22 10:00 PM   Result Value Ref Range    WBC 7.5 3.6 - 11.0 K/uL    RBC 4.56 3.80 - 5.20 M/uL    HGB 11.6 11.5 - 16.0 g/dL    HCT 36.6 35.0 - 47.0 %    MCV 80.3 80.0 - 99.0 FL    MCH 25.4 (L) 26.0 - 34.0 PG    MCHC 31.7 30.0 - 36.5 g/dL    RDW 15.7 (H) 11.5 - 14.5 %    PLATELET 460 027 - 629 K/uL    MPV 11.2 8.9 - 12.9 FL    NRBC 0.0 0  WBC    ABSOLUTE NRBC 0.00 0.00 - 0.01 K/uL    NEUTROPHILS 70 32 - 75 %    LYMPHOCYTES 22 12 - 49 %    MONOCYTES 4 (L) 5 - 13 %    EOSINOPHILS 2 0 - 7 %    BASOPHILS 1 0 - 1 %    IMMATURE GRANULOCYTES 1 (H) 0.0 - 0.5 %    ABS. NEUTROPHILS 5.3 1.8 - 8.0 K/UL    ABS. LYMPHOCYTES 1.7 0.8 - 3.5 K/UL    ABS. MONOCYTES 0.3 0.0 - 1.0 K/UL    ABS. EOSINOPHILS 0.1 0.0 - 0.4 K/UL    ABS. BASOPHILS 0.1 0.0 - 0.1 K/UL    ABS. IMM. GRANS. 0.0 0.00 - 0.04 K/UL    DF AUTOMATED     METABOLIC PANEL, COMPREHENSIVE    Collection Time: 08/16/22 10:00 PM   Result Value Ref Range    Sodium 141 136 - 145 mmol/L    Potassium 3.5 3.5 - 5.1 mmol/L    Chloride 104 97 - 108 mmol/L    CO2 28 21 - 32 mmol/L    Anion gap 9 5 - 15 mmol/L    Glucose 155 (H) 65 - 100 mg/dL    BUN 25 (H) 6 - 20 MG/DL    Creatinine 1.12 (H) 0.55 - 1.02 MG/DL    BUN/Creatinine ratio 22 (H) 12 - 20      GFR est AA 59 (L) >60 ml/min/1.73m2    GFR est non-AA 49 (L) >60 ml/min/1.73m2    Calcium 9.0 8.5 - 10.1 MG/DL    Bilirubin, total 0.3 0.2 - 1.0 MG/DL    ALT (SGPT) 764 (H) 12 - 78 U/L    AST (SGOT) 597 (H) 15 - 37 U/L    Alk.  phosphatase 80 45 - 117 U/L    Protein, total 7.1 6.4 - 8.2 g/dL    Albumin 3.7 3.5 - 5.0 g/dL    Globulin 3.4 2.0 - 4.0 g/dL    A-G Ratio 1.1 1.1 - 2.2     MAGNESIUM    Collection Time: 08/16/22 10:00 PM   Result Value Ref Range    Magnesium 2.2 1.6 - 2.4 mg/dL   SAMPLES BEING HELD    Collection Time: 08/16/22 10:00 PM   Result Value Ref Range    SAMPLES BEING HELD  1DK GRN     COMMENT        Add-on orders for these samples will be processed based on acceptable specimen integrity and analyte stability, which may vary by analyte. IMAGING:  No orders to display         Medications During Visit:  Medications   dilTIAZem IR (CARDIZEM) tablet 30 mg (30 mg Oral Given 8/16/22 2127)         DECISION MAKING:  Hal Paiz is a 78 y.o. female who comes in as above. Patient feeling better. Patient was provided Cardizem here, prescription was sent to her pharmacy. This time patient will be discharged home and have follow-up with cardiology and primary care. Patient does have transaminitis here, cause unknown but patient denies any type of abdominal pain. Recent viral symptoms about a week or 2 ago may be the cause. Otherwise follow-up for repeat labs      IMPRESSION:  1. Paroxysmal SVT (supraventricular tachycardia) ([facility] [address]    2. Transaminitis        DISPOSITION:  Discharged      Current Discharge Medication List        START taking these medications    Details   dilTIAZem IR (CARDIZEM) 30 mg tablet Take 1 Tablet by mouth three (3) times daily for 30 days.   Qty: 90 Tablet, Refills: 0  Start date: 8/16/2022, End date: 9/15/2022              Follow-up Information       Follow up With Specialties Details Why Contact Info    Silva Ding MD Family Medicine Schedule an appointment as soon as possible for a visit   [address] [facility]      Renetta Benitez MD Specialist Undefined, Cardiovascular Disease Physician Schedule an appointment as soon as possible for a visit   [address] [place] [address]  [phone]                The patient is asked to follow-up with their primary care provider in the next several days. They are to call tomorrow for an appointment. The patient is asked to return promptly for any increased concerns or worsening of symptoms. They can return to this emergency department or any other emergency department.

## 2022-08-17 NOTE — ED PROVIDER NOTES
Enio Delarosa is a 77 yo F with h/o HTN and SVT. She developed faintness at home and felt her heart racing. She drank cold water, took magnesium and lied down but it did not help so she called 911. When EMS arrived she was in SVT with . The gave adenosine, 6mg and 12mg and patient converted. Patient feels fine now. She has had episode of SVT in the past and has seen Dr. Heber Patten but stopped seeing him when she moved to [place] but has since moved back. She had been prescribed diltiazem but is now longer taking any medications. Past Medical History:   Diagnosis Date    Financial difficulties 11/23/2014    HTN (hypertension), benign 5/21/2012    Hypertension     Obesity 11/12/2012       Past Surgical History:   Procedure Laterality Date    CARDIAC SURG PROCEDURE UNLIST      cardiac cath    HX GYN      c section    HX OTHER SURGICAL      breast reduction         No family history on file. Social History     Socioeconomic History    Marital status: SINGLE     Spouse name: Not on file    Number of children: Not on file    Years of education: Not on file    Highest education level: Not on file   Occupational History    Not on file   Tobacco Use    Smoking status: Never    Smokeless tobacco: Never   Substance and Sexual Activity    Alcohol use: No    Drug use: No    Sexual activity: Yes     Birth control/protection: None   Other Topics Concern    Not on file   Social History Narrative    Not on file     Social Determinants of Health     Financial Resource Strain: Not on file   Food Insecurity: Not on file   Transportation Needs: Not on file   Physical Activity: Not on file   Stress: Not on file   Social Connections: Not on file   Intimate Partner Violence: Not on file   Housing Stability: Not on file         ALLERGIES: Percocet [oxycodone-acetaminophen], Darvocet a500 [propoxyphene n-acetaminophen], Hydrochlorothiazide, and Lisinopril    Review of Systems   Constitutional:  Negative for fever. HENT:  Negative for sore throat. Eyes:  Negative for visual disturbance. Respiratory:  Negative for cough. Cardiovascular:  Positive for palpitations. Negative for chest pain. Gastrointestinal:  Negative for abdominal pain. Genitourinary:  Negative for dysuria. Musculoskeletal:  Negative for back pain. Skin:  Negative for rash. Neurological:  Positive for light-headedness. Negative for headaches. Vitals:    08/16/22 2116   BP: (!) 160/96   Pulse: (!) 101   Resp: 18   Temp: 98.5 °F (36.9 °C)   SpO2: 95%   Weight: 85.3 kg (188 lb)            Physical Exam  Vitals and nursing note reviewed. Constitutional:       General: She is not in acute distress. Appearance: She is well-developed. HENT:      Head: Normocephalic and atraumatic. Mouth/Throat:      Mouth: Mucous membranes are moist.   Eyes:      Extraocular Movements: Extraocular movements intact. Conjunctiva/sclera: Conjunctivae normal.   Neck:      Trachea: Phonation normal.   Cardiovascular:      Rate and Rhythm: Normal rate and regular rhythm. Heart sounds: Normal heart sounds. Pulmonary:      Effort: Pulmonary effort is normal. No respiratory distress. Abdominal:      General: There is no distension. Musculoskeletal:         General: No tenderness. Normal range of motion. Cervical back: Normal range of motion. Skin:     General: Skin is warm and dry. Neurological:      General: No focal deficit present. Mental Status: She is alert. She is not disoriented. Motor: No abnormal muscle tone. ED EKG interpretation:  Rhythm: normal sinus rhythm and PVC's; and regular . Rate (approx.): 97; Axis: normal; P wave: normal; QRS interval: normal ; ST/T wave: non-specific changes; Other findings: abnormal ekg. This EKG was interpreted by Lazarus Clinton, MD,ED Provider. MDM       Parosyxmal SVT. Seen previously for same. Converted with 12mg adenosine prehospital by EMS.   Will check electrolytes. Plan for discharged home with prescription to restart diltiazem if normal.  Patient to follow-up with her prior cardiologist.      10:00 PM  Change of shift. Care of patient signed over to Dr. Jenny Rust. Bedside handoff complete. Awaiting Lab results.     Procedures

## 2022-08-17 NOTE — ED TRIAGE NOTES
Pt. Zora Mcfarland in by EMS for syncope and SVT, pt. Rate was 230's prior to given adenosine 6 mg and 12 mg, pt. States was feeling faint at home, drank cold water and laid down and took mag at home. Pt. Is in sinus rhythm.